# Patient Record
Sex: FEMALE | Race: BLACK OR AFRICAN AMERICAN | NOT HISPANIC OR LATINO
[De-identification: names, ages, dates, MRNs, and addresses within clinical notes are randomized per-mention and may not be internally consistent; named-entity substitution may affect disease eponyms.]

---

## 2017-02-28 ENCOUNTER — APPOINTMENT (OUTPATIENT)
Dept: CARDIOLOGY | Facility: CLINIC | Age: 62
End: 2017-02-28

## 2017-02-28 VITALS — BODY MASS INDEX: 34.5 KG/M2 | WEIGHT: 201 LBS

## 2017-02-28 VITALS
DIASTOLIC BLOOD PRESSURE: 79 MMHG | HEART RATE: 79 BPM | HEIGHT: 64 IN | SYSTOLIC BLOOD PRESSURE: 115 MMHG | OXYGEN SATURATION: 96 %

## 2017-03-06 ENCOUNTER — APPOINTMENT (OUTPATIENT)
Dept: CARDIOLOGY | Facility: CLINIC | Age: 62
End: 2017-03-06

## 2017-03-08 ENCOUNTER — NON-APPOINTMENT (OUTPATIENT)
Age: 62
End: 2017-03-08

## 2017-03-09 ENCOUNTER — APPOINTMENT (OUTPATIENT)
Dept: CARDIOLOGY | Facility: CLINIC | Age: 62
End: 2017-03-09

## 2017-03-09 VITALS
SYSTOLIC BLOOD PRESSURE: 114 MMHG | OXYGEN SATURATION: 95 % | WEIGHT: 200 LBS | HEIGHT: 64 IN | HEART RATE: 63 BPM | DIASTOLIC BLOOD PRESSURE: 78 MMHG | BODY MASS INDEX: 34.15 KG/M2

## 2017-03-09 DIAGNOSIS — R07.89 OTHER CHEST PAIN: ICD-10-CM

## 2017-05-10 ENCOUNTER — APPOINTMENT (OUTPATIENT)
Dept: CARDIOLOGY | Facility: CLINIC | Age: 62
End: 2017-05-10

## 2017-05-18 ENCOUNTER — FORM ENCOUNTER (OUTPATIENT)
Age: 62
End: 2017-05-18

## 2017-05-19 ENCOUNTER — OUTPATIENT (OUTPATIENT)
Dept: OUTPATIENT SERVICES | Facility: HOSPITAL | Age: 62
LOS: 1 days | End: 2017-05-19

## 2017-05-19 ENCOUNTER — APPOINTMENT (OUTPATIENT)
Dept: ULTRASOUND IMAGING | Facility: CLINIC | Age: 62
End: 2017-05-19

## 2017-05-19 ENCOUNTER — APPOINTMENT (OUTPATIENT)
Dept: FAMILY MEDICINE | Facility: CLINIC | Age: 62
End: 2017-05-19

## 2017-05-19 VITALS
HEART RATE: 79 BPM | DIASTOLIC BLOOD PRESSURE: 70 MMHG | WEIGHT: 212 LBS | SYSTOLIC BLOOD PRESSURE: 114 MMHG | BODY MASS INDEX: 36.19 KG/M2 | TEMPERATURE: 98.6 F | HEIGHT: 64 IN

## 2017-05-19 DIAGNOSIS — Z90.49 ACQUIRED ABSENCE OF OTHER SPECIFIED PARTS OF DIGESTIVE TRACT: Chronic | ICD-10-CM

## 2017-05-19 DIAGNOSIS — Z23 ENCOUNTER FOR IMMUNIZATION: ICD-10-CM

## 2017-05-19 DIAGNOSIS — O34.21 MATERNAL CARE FOR SCAR FROM PREVIOUS CESAREAN DELIVERY: Chronic | ICD-10-CM

## 2017-05-19 DIAGNOSIS — Z98.89 OTHER SPECIFIED POSTPROCEDURAL STATES: Chronic | ICD-10-CM

## 2017-05-23 ENCOUNTER — NON-APPOINTMENT (OUTPATIENT)
Age: 62
End: 2017-05-23

## 2017-05-23 ENCOUNTER — APPOINTMENT (OUTPATIENT)
Dept: CARDIOLOGY | Facility: CLINIC | Age: 62
End: 2017-05-23

## 2017-05-23 VITALS
WEIGHT: 212 LBS | DIASTOLIC BLOOD PRESSURE: 76 MMHG | SYSTOLIC BLOOD PRESSURE: 120 MMHG | BODY MASS INDEX: 36.19 KG/M2 | HEIGHT: 64 IN | HEART RATE: 75 BPM | OXYGEN SATURATION: 96 %

## 2017-05-23 DIAGNOSIS — R07.89 OTHER CHEST PAIN: ICD-10-CM

## 2017-05-23 RX ORDER — CYCLOBENZAPRINE HYDROCHLORIDE 10 MG/1
10 TABLET, FILM COATED ORAL
Qty: 30 | Refills: 0 | Status: DISCONTINUED | COMMUNITY
Start: 2016-11-22

## 2017-05-23 RX ORDER — SODIUM SULFATE, POTASSIUM SULFATE, MAGNESIUM SULFATE 17.5; 3.13; 1.6 G/ML; G/ML; G/ML
17.5-3.13-1.6 SOLUTION, CONCENTRATE ORAL
Qty: 354 | Refills: 0 | Status: DISCONTINUED | COMMUNITY
Start: 2017-03-20

## 2017-06-06 ENCOUNTER — OTHER (OUTPATIENT)
Age: 62
End: 2017-06-06

## 2017-06-10 ENCOUNTER — FORM ENCOUNTER (OUTPATIENT)
Age: 62
End: 2017-06-10

## 2017-06-11 ENCOUNTER — OUTPATIENT (OUTPATIENT)
Dept: OUTPATIENT SERVICES | Facility: HOSPITAL | Age: 62
LOS: 1 days | End: 2017-06-11
Payer: COMMERCIAL

## 2017-06-11 ENCOUNTER — APPOINTMENT (OUTPATIENT)
Dept: MRI IMAGING | Facility: CLINIC | Age: 62
End: 2017-06-11

## 2017-06-11 DIAGNOSIS — O34.21 MATERNAL CARE FOR SCAR FROM PREVIOUS CESAREAN DELIVERY: Chronic | ICD-10-CM

## 2017-06-11 DIAGNOSIS — Z90.49 ACQUIRED ABSENCE OF OTHER SPECIFIED PARTS OF DIGESTIVE TRACT: Chronic | ICD-10-CM

## 2017-06-11 DIAGNOSIS — R22.9 LOCALIZED SWELLING, MASS AND LUMP, UNSPECIFIED: ICD-10-CM

## 2017-06-11 DIAGNOSIS — Z98.89 OTHER SPECIFIED POSTPROCEDURAL STATES: Chronic | ICD-10-CM

## 2017-06-11 PROCEDURE — A9585: CPT

## 2017-06-11 PROCEDURE — 82565 ASSAY OF CREATININE: CPT

## 2017-06-11 PROCEDURE — 74183 MRI ABD W/O CNTR FLWD CNTR: CPT

## 2017-06-16 ENCOUNTER — OTHER (OUTPATIENT)
Age: 62
End: 2017-06-16

## 2017-06-19 ENCOUNTER — APPOINTMENT (OUTPATIENT)
Dept: MRI IMAGING | Facility: CLINIC | Age: 62
End: 2017-06-19

## 2017-06-26 ENCOUNTER — APPOINTMENT (OUTPATIENT)
Dept: SURGERY | Facility: CLINIC | Age: 62
End: 2017-06-26

## 2017-06-26 VITALS
HEIGHT: 63.5 IN | SYSTOLIC BLOOD PRESSURE: 103 MMHG | WEIGHT: 212.03 LBS | RESPIRATION RATE: 14 BRPM | TEMPERATURE: 98.6 F | DIASTOLIC BLOOD PRESSURE: 64 MMHG | OXYGEN SATURATION: 97 % | BODY MASS INDEX: 37.11 KG/M2 | HEART RATE: 83 BPM

## 2017-06-26 DIAGNOSIS — I25.2 OLD MYOCARDIAL INFARCTION: ICD-10-CM

## 2017-06-26 DIAGNOSIS — Z82.49 FAMILY HISTORY OF ISCHEMIC HEART DISEASE AND OTHER DISEASES OF THE CIRCULATORY SYSTEM: ICD-10-CM

## 2017-06-26 DIAGNOSIS — Z83.3 FAMILY HISTORY OF DIABETES MELLITUS: ICD-10-CM

## 2017-06-26 RX ORDER — IBUPROFEN 400 MG/1
400 TABLET, FILM COATED ORAL TWICE DAILY
Qty: 14 | Refills: 0 | Status: DISCONTINUED | COMMUNITY
Start: 2017-02-28 | End: 2017-06-26

## 2017-08-28 ENCOUNTER — APPOINTMENT (OUTPATIENT)
Dept: SURGERY | Facility: CLINIC | Age: 62
End: 2017-08-28
Payer: COMMERCIAL

## 2017-08-28 VITALS
HEIGHT: 63.5 IN | SYSTOLIC BLOOD PRESSURE: 110 MMHG | WEIGHT: 214 LBS | TEMPERATURE: 98.2 F | RESPIRATION RATE: 14 BRPM | BODY MASS INDEX: 37.45 KG/M2 | DIASTOLIC BLOOD PRESSURE: 72 MMHG | OXYGEN SATURATION: 97 % | HEART RATE: 76 BPM

## 2017-08-28 DIAGNOSIS — R19.00 INTRA-ABDOMINAL AND PELVIC SWELLING, MASS AND LUMP, UNSPECIFIED SITE: ICD-10-CM

## 2017-08-28 PROCEDURE — 99213 OFFICE O/P EST LOW 20 MIN: CPT

## 2018-02-26 ENCOUNTER — APPOINTMENT (OUTPATIENT)
Dept: SURGERY | Facility: CLINIC | Age: 63
End: 2018-02-26

## 2018-03-29 ENCOUNTER — APPOINTMENT (OUTPATIENT)
Dept: FAMILY MEDICINE | Facility: CLINIC | Age: 63
End: 2018-03-29
Payer: COMMERCIAL

## 2018-03-29 VITALS — DIASTOLIC BLOOD PRESSURE: 82 MMHG | SYSTOLIC BLOOD PRESSURE: 122 MMHG | HEART RATE: 84 BPM | WEIGHT: 212.13 LBS

## 2018-03-29 DIAGNOSIS — M25.561 PAIN IN RIGHT KNEE: ICD-10-CM

## 2018-03-29 PROCEDURE — 99214 OFFICE O/P EST MOD 30 MIN: CPT

## 2018-04-24 ENCOUNTER — APPOINTMENT (OUTPATIENT)
Dept: FAMILY MEDICINE | Facility: CLINIC | Age: 63
End: 2018-04-24

## 2018-05-29 ENCOUNTER — APPOINTMENT (OUTPATIENT)
Dept: CARDIOLOGY | Facility: CLINIC | Age: 63
End: 2018-05-29

## 2018-05-31 ENCOUNTER — RX RENEWAL (OUTPATIENT)
Age: 63
End: 2018-05-31

## 2018-06-05 ENCOUNTER — NON-APPOINTMENT (OUTPATIENT)
Age: 63
End: 2018-06-05

## 2018-06-05 ENCOUNTER — APPOINTMENT (OUTPATIENT)
Dept: CARDIOLOGY | Facility: CLINIC | Age: 63
End: 2018-06-05
Payer: COMMERCIAL

## 2018-06-05 VITALS
OXYGEN SATURATION: 95 % | DIASTOLIC BLOOD PRESSURE: 77 MMHG | HEART RATE: 100 BPM | HEIGHT: 63.5 IN | BODY MASS INDEX: 37.8 KG/M2 | SYSTOLIC BLOOD PRESSURE: 116 MMHG | WEIGHT: 216 LBS

## 2018-06-05 DIAGNOSIS — I21.4 NON-ST ELEVATION (NSTEMI) MYOCARDIAL INFARCTION: ICD-10-CM

## 2018-06-05 PROCEDURE — 99214 OFFICE O/P EST MOD 30 MIN: CPT

## 2018-06-05 PROCEDURE — 93000 ELECTROCARDIOGRAM COMPLETE: CPT

## 2018-06-21 ENCOUNTER — APPOINTMENT (OUTPATIENT)
Dept: CARDIOLOGY | Facility: CLINIC | Age: 63
End: 2018-06-21
Payer: COMMERCIAL

## 2018-06-21 PROCEDURE — 93306 TTE W/DOPPLER COMPLETE: CPT

## 2018-07-03 ENCOUNTER — APPOINTMENT (OUTPATIENT)
Dept: CARDIOLOGY | Facility: CLINIC | Age: 63
End: 2018-07-03

## 2018-07-05 ENCOUNTER — APPOINTMENT (OUTPATIENT)
Dept: FAMILY MEDICINE | Facility: CLINIC | Age: 63
End: 2018-07-05
Payer: COMMERCIAL

## 2018-07-05 VITALS
SYSTOLIC BLOOD PRESSURE: 123 MMHG | HEIGHT: 65 IN | WEIGHT: 215 LBS | BODY MASS INDEX: 35.82 KG/M2 | HEART RATE: 78 BPM | DIASTOLIC BLOOD PRESSURE: 65 MMHG

## 2018-07-05 PROCEDURE — 99396 PREV VISIT EST AGE 40-64: CPT

## 2018-07-05 NOTE — HEALTH RISK ASSESSMENT
[Good] : ~his/her~ current health as good [Excellent] : ~his/her~  mood as  excellent [No falls in past year] : Patient reported no falls in the past year [0] : 2) Feeling down, depressed, or hopeless: Not at all (0) [Patient reported mammogram was normal] : Patient reported mammogram was normal [Patient reported PAP Smear was normal] : Patient reported PAP Smear was normal [Patient reported bone density results were normal] : Patient reported bone density results were normal [Patient reported colonoscopy was normal] : Patient reported colonoscopy was normal [HIV Test offered] : HIV Test offered [Hepatitis C test offered] : Hepatitis C test offered [None] : None [With Family] : lives with family [# of Members in Household ___] :  household currently consist of [unfilled] member(s) [Employed] : employed [College] : College [] :  [# Of Children ___] : has [unfilled] children [Feels Safe at Home] : Feels safe at home [Fully functional (bathing, dressing, toileting, transferring, walking, feeding)] : Fully functional (bathing, dressing, toileting, transferring, walking, feeding) [Fully functional (using the telephone, shopping, preparing meals, housekeeping, doing laundry, using] : Fully functional and needs no help or supervision to perform IADLs (using the telephone, shopping, preparing meals, housekeeping, doing laundry, using transportation, managing medications and managing finances) [Smoke Detector] : smoke detector [Carbon Monoxide Detector] : carbon monoxide detector [Safety elements used in home] : safety elements used in home [Seat Belt] :  uses seat belt [Discussed at today's visit] : Advance Directives Discussed at today's visit [] : No [[id number]] : 0 [Change in mental status noted] : No change in mental status noted [Language] : denies difficulty with language [Learning/Retaining New Information] : denies difficulty learning/retaining new information [Handling Complex Tasks] : denies difficulty handling complex tasks [Reasoning] : denies difficulty with reasoning [Sexually Active] : not sexually active [High Risk Behavior] : no high risk behavior [Reports changes in hearing] : Reports no changes in hearing [Reports changes in vision] : Reports no changes in vision [Reports changes in dental health] : Reports no changes in dental health [Sunscreen] : does not use sunscreen [MammogramDate] : 01/18 [MammogramComments] : [facility] [PapSmearDate] : 12/17 [BoneDensityDate] : 1/17 [BoneDensityComments] : PER PATIENT REPORT DONE WITH GYN [ColonoscopyDate] : 04/17 [FreeTextEntry2] : NURSING [de-identified] : GLASSES FOR READING [de-identified] : DUE TO SEE DENTIST

## 2018-07-05 NOTE — COUNSELING
[Weight management counseling provided] : Weight management [Healthy eating counseling provided] : healthy eating [Activity counseling provided] : activity [Low Fat Diet] : Low fat diet [Decrease Portions] : Decrease food portions [Walking] : Walking [None] : None [Good understanding] : Patient has a good understanding of lifestyle changes and the steps needed to achieve self management goals

## 2018-07-05 NOTE — PHYSICAL EXAM
[No Acute Distress] : no acute distress [Well Nourished] : well nourished [Well-Appearing] : well-appearing [Normal Sclera/Conjunctiva] : normal sclera/conjunctiva [PERRL] : pupils equal round and reactive to light [EOMI] : extraocular movements intact [Normal Outer Ear/Nose] : the outer ears and nose were normal in appearance [Normal Oropharynx] : the oropharynx was normal [Normal TMs] : both tympanic membranes were normal [Supple] : supple [No Lymphadenopathy] : no lymphadenopathy [No Respiratory Distress] : no respiratory distress  [Clear to Auscultation] : lungs were clear to auscultation bilaterally [No Accessory Muscle Use] : no accessory muscle use [Normal Rate] : normal rate  [Regular Rhythm] : with a regular rhythm [Normal S1, S2] : normal S1 and S2 [No Murmur] : no murmur heard [Soft] : abdomen soft [Non Tender] : non-tender [Normal Bowel Sounds] : normal bowel sounds [No Joint Swelling] : no joint swelling [Grossly Normal Strength/Tone] : grossly normal strength/tone [No Rash] : no rash [Normal Gait] : normal gait [Coordination Grossly Intact] : coordination grossly intact [No Focal Deficits] : no focal deficits [Speech Grossly Normal] : speech grossly normal [Normal Mood] : the mood was normal [Normal Insight/Judgement] : insight and judgment were intact [Acne] : no acne

## 2018-07-05 NOTE — REVIEW OF SYSTEMS
[Fever] : no fever [Chills] : no chills [Fatigue] : no fatigue [Discharge] : no discharge [Pain] : no pain [Earache] : no earache [Nasal Discharge] : no nasal discharge [Sore Throat] : no sore throat [Chest Pain] : no chest pain [Palpitations] : no palpitations [Lower Ext Edema] : no lower extremity edema [Shortness Of Breath] : no shortness of breath [Cough] : no cough [Abdominal Pain] : no abdominal pain [Nausea] : no nausea [Diarrhea] : diarrhea [Vomiting] : no vomiting [Melena] : no melena [Dysuria] : no dysuria [Hematuria] : no hematuria [Joint Pain] : no joint pain [Muscle Pain] : no muscle pain [Itching] : no itching [Skin Rash] : no skin rash [Headache] : no headache [Dizziness] : no dizziness [Fainting] : no fainting [Suicidal] : not suicidal [Depression] : no depression [Easy Bleeding] : no easy bleeding [Easy Bruising] : no easy bruising

## 2018-07-05 NOTE — PLAN
[FreeTextEntry1] : ADVISED TO RESCHEDULE MISSED CARDIOLOGY APPOINTMENT AND OVERDUE TO FOLLOW-UP WITH SURGERY DR. VALENCIA.\par NEED MAMMOGRAM RESULTS FROM JAN[facility]\par TO CHECK ON LAST TETANUS VACCINE WITH [employer]\par FLU VACCINE IN THE FALL\par HEALTHY DIET AND LIFESTYLE MODIFICATIONS\par HEALTHY WEIGHT LOSS \par CHECK ROUTINE LAB WORK\par VISION SCREENING\par FOLLOW-UP ALL SPECIALISTS AS DIRECTED \par CALL WITH ANY QUESTIONS, CONCERNS OR CHANGES

## 2018-07-11 LAB — HEMOCCULT STL QL IA: NEGATIVE

## 2018-08-28 ENCOUNTER — RESULT REVIEW (OUTPATIENT)
Age: 63
End: 2018-08-28

## 2018-10-30 ENCOUNTER — APPOINTMENT (OUTPATIENT)
Dept: CARDIOLOGY | Facility: CLINIC | Age: 63
End: 2018-10-30

## 2018-11-13 ENCOUNTER — APPOINTMENT (OUTPATIENT)
Dept: CARDIOLOGY | Facility: CLINIC | Age: 63
End: 2018-11-13
Payer: COMMERCIAL

## 2018-11-13 ENCOUNTER — NON-APPOINTMENT (OUTPATIENT)
Age: 63
End: 2018-11-13

## 2018-11-13 VITALS
OXYGEN SATURATION: 95 % | WEIGHT: 213 LBS | DIASTOLIC BLOOD PRESSURE: 82 MMHG | HEIGHT: 65 IN | BODY MASS INDEX: 35.49 KG/M2 | SYSTOLIC BLOOD PRESSURE: 130 MMHG | HEART RATE: 76 BPM

## 2018-11-13 PROCEDURE — 99214 OFFICE O/P EST MOD 30 MIN: CPT | Mod: 25

## 2018-11-13 PROCEDURE — 93000 ELECTROCARDIOGRAM COMPLETE: CPT

## 2018-11-13 NOTE — PHYSICAL EXAM
[General Appearance - Well Developed] : well developed [No Deformities] : no deformities [General Appearance - In No Acute Distress] : no acute distress [Normal Conjunctiva] : the conjunctiva exhibited no abnormalities [Normal Oral Mucosa] : normal oral mucosa [Normal Jugular Venous V Waves Present] : normal jugular venous V waves present [] : no respiratory distress [Respiration, Rhythm And Depth] : normal respiratory rhythm and effort [Exaggerated Use Of Accessory Muscles For Inspiration] : no accessory muscle use [Auscultation Breath Sounds / Voice Sounds] : lungs were clear to auscultation bilaterally [Heart Rate And Rhythm] : heart rate and rhythm were normal [Heart Sounds] : normal S1 and S2 [Arterial Pulses Normal] : the arterial pulses were normal [Edema] : no peripheral edema present [Bowel Sounds] : normal bowel sounds [Abnormal Walk] : normal gait [Gait - Sufficient For Exercise Testing] : the gait was sufficient for exercise testing [Nail Clubbing] : no clubbing of the fingernails [Cyanosis, Localized] : no localized cyanosis [Nail Splinter Hemorrhages] : no splinter hemorrhages of the nails [Skin Color & Pigmentation] : normal skin color and pigmentation [Skin Turgor] : normal skin turgor [Oriented To Time, Place, And Person] : oriented to person, place, and time [Impaired Insight] : insight and judgment were intact [Mood] : the mood was normal [No Anxiety] : not feeling anxious

## 2018-11-15 NOTE — DISCUSSION/SUMMARY
[Patient] : the patient [Risks] : risks [Benefits] : benefits [FreeTextEntry1] : Plan\par 1. CAD- h/o NSTEMI in 2015, stable EKG, continue ASA \par 2. HTN- controlled, continue metoprolol BID\par 3. HLD- at goal\par 4. f/u in 1 year with Dr. Gómez\par \par Jillian Stark NP-C

## 2018-11-15 NOTE — HISTORY OF PRESENT ILLNESS
[FreeTextEntry1] : 63 year old female with a history of CAD , NSTEMI, HTN, HLD, who presents for routine follow up.  She states she is feeling well. She denies CP, SOB, GARCIA, palpitations, dizziness, lightheadedness, activity intolerance, syncope or near syncope. She reports she is compliant with her medications. She exercises on treadmill/ ash 2-3x/week, without CP or SOB.

## 2019-04-20 ENCOUNTER — TRANSCRIPTION ENCOUNTER (OUTPATIENT)
Age: 64
End: 2019-04-20

## 2019-10-11 ENCOUNTER — APPOINTMENT (OUTPATIENT)
Dept: FAMILY MEDICINE | Facility: CLINIC | Age: 64
End: 2019-10-11
Payer: COMMERCIAL

## 2019-10-11 ENCOUNTER — NON-APPOINTMENT (OUTPATIENT)
Age: 64
End: 2019-10-11

## 2019-10-11 VITALS
SYSTOLIC BLOOD PRESSURE: 140 MMHG | WEIGHT: 224 LBS | BODY MASS INDEX: 37.32 KG/M2 | HEART RATE: 78 BPM | HEIGHT: 65 IN | DIASTOLIC BLOOD PRESSURE: 78 MMHG

## 2019-10-11 VITALS — DIASTOLIC BLOOD PRESSURE: 82 MMHG | SYSTOLIC BLOOD PRESSURE: 122 MMHG

## 2019-10-11 PROCEDURE — 93000 ELECTROCARDIOGRAM COMPLETE: CPT

## 2019-10-11 PROCEDURE — 99396 PREV VISIT EST AGE 40-64: CPT | Mod: 25

## 2019-10-11 NOTE — HEALTH RISK ASSESSMENT
[Good] : ~his/her~ current health as good [Very Good] : ~his/her~  mood as very good [Patient reported mammogram was normal] : Patient reported mammogram was normal [Patient reported PAP Smear was normal] : Patient reported PAP Smear was normal [Patient reported bone density results were normal] : Patient reported bone density results were normal [Patient reported colonoscopy was normal] : Patient reported colonoscopy was normal [HIV Test offered] : HIV Test offered [Hepatitis C test offered] : Hepatitis C test offered [With Family] : lives with family [# of Members in Household ___] :  household currently consist of [unfilled] member(s) [Employed] : employed [College] : College [] :  [# Of Children ___] : has [unfilled] children [No falls in past year] : Patient reported no falls in the past year [0] : 2) Feeling down, depressed, or hopeless: Not at all (0) [None] : None [Feels Safe at Home] : Feels safe at home [Fully functional (bathing, dressing, toileting, transferring, walking, feeding)] : Fully functional (bathing, dressing, toileting, transferring, walking, feeding) [Fully functional (using the telephone, shopping, preparing meals, housekeeping, doing laundry, using] : Fully functional and needs no help or supervision to perform IADLs (using the telephone, shopping, preparing meals, housekeeping, doing laundry, using transportation, managing medications and managing finances) [Reports normal functional visual acuity (ie: able to read med bottle)] : Reports normal functional visual acuity [Smoke Detector] : smoke detector [Carbon Monoxide Detector] : carbon monoxide detector [Safety elements used in home] : safety elements used in home [Seat Belt] :  uses seat belt [With Patient/Caregiver] : With Patient/Caregiver [No] : In the past 12 months have you used drugs other than those required for medical reasons? No [] : No [de-identified] : WALKING. [de-identified] : GOOD. [[id number]] : 0 [[id number]] : 0 [Change in mental status noted] : No change in mental status noted [Language] : denies difficulty with language [Behavior] : denies difficulty with behavior [Learning/Retaining New Information] : denies difficulty learning/retaining new information [Handling Complex Tasks] : denies difficulty handling complex tasks [Reasoning] : denies difficulty with reasoning [Spatial Ability and Orientation] : denies difficulty with spatial ability and orientation [Reports changes in hearing] : Reports no changes in hearing [Reports changes in vision] : Reports no changes in vision [Reports changes in dental health] : Reports no changes in dental health [Sunscreen] : does not use sunscreen [MammogramDate] : 01/19 [PapSmearDate] : 12/18 [BoneDensityDate] : 01/19 [ColonoscopyDate] : 04/17 [de-identified] : GLASSES FOR READING.  [AdvancecareDate] : 10/19

## 2019-10-11 NOTE — COUNSELING
[Encouraged to increase physical activity] : Encouraged to increase physical activity [Decrease Portions] : decrease portions [____ min/wk Activity] : [unfilled] min/wk activity

## 2019-10-11 NOTE — HISTORY OF PRESENT ILLNESS
[FreeTextEntry1] : WELLNESS EXAM.  [de-identified] : MS. SO IS A PLEASANT 63 YO PRESENTING FOR YEARLY WELLNESS EXAM. FEELING WELL. CHRONIC HISTORY OF HYPERTENSION, HYPERLIPIDEMIA, CAD AND ASTHMA. BLOOD PRESSURE CONTROLLED ON METOPROLOL. UP TO DATE WITH GYN. HAD MAMMOGRAM AND BONE DENSITY. DIET IS GOOD. WALKING AND GOES TO THE GYM ONCE A WEEK FOR EXERCISE. HAS HAD NO CHEST PAIN, PALPITATIONS, SHORTNESS OF BREATH, HEADACHE, DIZZINESS, GI OR URINARY COMPLAINTS. NO OTHER COMPLAINTS TODAY. \par \par SPECIALISTS:\par GYN: DR. CORBETT - YEARLY EXAM\par PULMONOLOGY: DR. RHODES\par RHEUMATOLOGY: DR. DUNLAP\par

## 2019-10-11 NOTE — PHYSICAL EXAM
[No Acute Distress] : no acute distress [Well Nourished] : well nourished [Well-Appearing] : well-appearing [Normal Sclera/Conjunctiva] : normal sclera/conjunctiva [PERRL] : pupils equal round and reactive to light [EOMI] : extraocular movements intact [Normal Outer Ear/Nose] : the outer ears and nose were normal in appearance [Normal Oropharynx] : the oropharynx was normal [Normal TMs] : both tympanic membranes were normal [No Lymphadenopathy] : no lymphadenopathy [Supple] : supple [No Respiratory Distress] : no respiratory distress  [No Accessory Muscle Use] : no accessory muscle use [Clear to Auscultation] : lungs were clear to auscultation bilaterally [Normal Rate] : normal rate  [Regular Rhythm] : with a regular rhythm [Normal S1, S2] : normal S1 and S2 [No Murmur] : no murmur heard [Pedal Pulses Present] : the pedal pulses are present [No Edema] : there was no peripheral edema [Soft] : abdomen soft [Non Tender] : non-tender [Normal Bowel Sounds] : normal bowel sounds [No Joint Swelling] : no joint swelling [Grossly Normal Strength/Tone] : grossly normal strength/tone [No Rash] : no rash [Coordination Grossly Intact] : coordination grossly intact [No Focal Deficits] : no focal deficits [Normal Gait] : normal gait [Deep Tendon Reflexes (DTR)] : deep tendon reflexes were 2+ and symmetric [Speech Grossly Normal] : speech grossly normal [Normal Affect] : the affect was normal [Normal Mood] : the mood was normal [Normal Insight/Judgement] : insight and judgment were intact [de-identified] : DISTAL PULSES INTACT, FULL ROM.

## 2019-10-11 NOTE — REVIEW OF SYSTEMS
[Fever] : no fever [Chills] : no chills [Fatigue] : no fatigue [Discharge] : no discharge [Pain] : no pain [Vision Problems] : no vision problems [Earache] : no earache [Nasal Discharge] : no nasal discharge [Sore Throat] : no sore throat [Chest Pain] : no chest pain [Palpitations] : no palpitations [Lower Ext Edema] : no lower extremity edema [Shortness Of Breath] : no shortness of breath [Wheezing] : no wheezing [Cough] : no cough [Abdominal Pain] : no abdominal pain [Diarrhea] : diarrhea [Vomiting] : no vomiting [Melena] : no melena [Dysuria] : no dysuria [Hematuria] : no hematuria [Joint Pain] : no joint pain [Muscle Weakness] : no muscle weakness [Muscle Pain] : no muscle pain [Back Pain] : no back pain [Itching] : no itching [Skin Rash] : no skin rash [Headache] : no headache [Dizziness] : no dizziness [Fainting] : no fainting [Suicidal] : not suicidal [Depression] : no depression [Easy Bleeding] : no easy bleeding [Easy Bruising] : no easy bruising

## 2019-10-11 NOTE — PLAN
[FreeTextEntry1] : CHECK ROUTINE LAB WORK AND STOOL OCCULT \par EKG: NSR AT 78 BPM, NO ACUTE ST-T WAVE CHANGES\par VISION SCREENING \par HEALTHY DIET AND LIFESTYLE MODIFICATIONS\par HEALTHY WEIGHT LOSS REVIEWED \par BLOOD PRESSURE CONTROLLED \par CONTINUE ALL MEDICATIONS AS DIRECTED\par FOLLOW-UP ALL SPECIALISTS AS DIRECTED \par NEED MAMMOGRAM AND BONE DENSITY REPORTS - [facility]\par NEED RHEUMATOLOGY CONSULTANT NOTE - DR. DUNLAP\par CONSIDER SHINGRIX\par CALL WITH ANY QUESTIONS, CONCERNS OR CHANGES.

## 2019-10-24 ENCOUNTER — APPOINTMENT (OUTPATIENT)
Dept: FAMILY MEDICINE | Facility: CLINIC | Age: 64
End: 2019-10-24
Payer: COMMERCIAL

## 2019-10-24 VITALS
SYSTOLIC BLOOD PRESSURE: 140 MMHG | DIASTOLIC BLOOD PRESSURE: 90 MMHG | BODY MASS INDEX: 38.24 KG/M2 | HEART RATE: 82 BPM | HEIGHT: 64 IN | WEIGHT: 224 LBS

## 2019-10-24 DIAGNOSIS — E55.9 VITAMIN D DEFICIENCY, UNSPECIFIED: ICD-10-CM

## 2019-10-24 PROCEDURE — 99214 OFFICE O/P EST MOD 30 MIN: CPT

## 2019-10-27 PROBLEM — E55.9 VITAMIN D INSUFFICIENCY: Status: ACTIVE | Noted: 2019-10-27

## 2019-10-27 NOTE — HISTORY OF PRESENT ILLNESS
[FreeTextEntry1] : F/U HTN [de-identified] : MS. SO IS A PLEASANT 63 YO PRESENTING FOR FOLLOW-UP TO REVIEW LABS. CHRONIC HISTORY OF HYPERTENSION, HYPERLIPIDEMIA, CAD AND ASTHMA. BLOOD PRESSURE CONTROLLED ON METOPROLOL. LAST SAW GYN IN DECEMBER 2018. STATES THAT SHE HAS A KNOWN HISTORY OF HEPATITIS C IN 2001, AND WAS GIVEN MEDICATION TREATMENT FOR 2 YEARS BY GI DR. LOVE. DID NOT FOLLOW UP AFTER AND STATES SHE DID NOT NEED TO FOLLOW-UP.  HAS HAD NO ABDOMINAL PAIN OR N/V/D.  NO KNOWN HISTORY OF LOW VITAMIN D LEVELS.  IS NOT ON VITAMIN D SUPPLEMENTS.  NO KNOWN HISTORY OF HSV POSITIVE.  DENIES LESIONS.  SEES GYN ROUTINELY.  ASTHMA WELL CONTROLLED.  INFREQUENT RESCUE INHALER USE.  HAS HAD NO HEADACHES, DIZZINESS, CHEST PAIN, SHORTNESS OF BREATH, GI OR URINARY COMPLAINTS. NO OTHER COMPLAINTS TODAY.

## 2019-10-27 NOTE — REVIEW OF SYSTEMS
[Fever] : no fever [Chills] : no chills [Fatigue] : no fatigue [Chest Pain] : no chest pain [Palpitations] : no palpitations [Lower Ext Edema] : no lower extremity edema [Shortness Of Breath] : no shortness of breath [Wheezing] : no wheezing [Cough] : no cough [Abdominal Pain] : no abdominal pain [Nausea] : no nausea [Diarrhea] : diarrhea [Vomiting] : no vomiting [Dysuria] : no dysuria [Hematuria] : no hematuria [Joint Pain] : no joint pain [Muscle Weakness] : no muscle weakness [Muscle Pain] : no muscle pain [Headache] : no headache [Dizziness] : no dizziness [Fainting] : no fainting [Easy Bleeding] : no easy bleeding [Easy Bruising] : no easy bruising

## 2019-10-27 NOTE — PHYSICAL EXAM
[No Acute Distress] : no acute distress [Well Nourished] : well nourished [Well-Appearing] : well-appearing [No Lymphadenopathy] : no lymphadenopathy [Supple] : supple [No Respiratory Distress] : no respiratory distress  [No Accessory Muscle Use] : no accessory muscle use [Clear to Auscultation] : lungs were clear to auscultation bilaterally [Normal Rate] : normal rate  [Regular Rhythm] : with a regular rhythm [Normal S1, S2] : normal S1 and S2 [No Murmur] : no murmur heard [Pedal Pulses Present] : the pedal pulses are present [No Edema] : there was no peripheral edema [Soft] : abdomen soft [Non Tender] : non-tender [Normal Bowel Sounds] : normal bowel sounds [No Joint Swelling] : no joint swelling [Grossly Normal Strength/Tone] : grossly normal strength/tone [Speech Grossly Normal] : speech grossly normal [Memory Grossly Normal] : memory grossly normal [Alert and Oriented x3] : oriented to person, place, and time

## 2019-10-27 NOTE — PLAN
[FreeTextEntry1] : RECENT LAB WORK REVIEWED\par BEGIN VITAMIN D 2000 MG AND WILL MONITOR LEVELS\par KNOWN HISTORY OF PRIOR HEP C S/P TREATMENT - TO FOLLOW-UP WITH GI AND ADDITIONAL LAB WORK ORDERED\par DISCUSSED HSV RESULTS.  FOLLOW-UP WITH GYN AND PRECAUTIONS GIVEN\par FOLLOW-UP IN 6 MONTHS\par CONTINUE ALL MEDICATIONS AS DIRECTED\par FOLLOW-UP WITH ALL SPECIALISTS AS DIRECTED\par CALL WITH ANY QUESTIONS, CONCERNS OR CHANGES \par

## 2019-11-12 LAB — HEMOCCULT STL QL IA: NEGATIVE

## 2019-11-14 ENCOUNTER — APPOINTMENT (OUTPATIENT)
Dept: CARDIOLOGY | Facility: CLINIC | Age: 64
End: 2019-11-14

## 2019-12-12 ENCOUNTER — NON-APPOINTMENT (OUTPATIENT)
Age: 64
End: 2019-12-12

## 2019-12-12 ENCOUNTER — APPOINTMENT (OUTPATIENT)
Dept: CARDIOLOGY | Facility: CLINIC | Age: 64
End: 2019-12-12
Payer: COMMERCIAL

## 2019-12-12 VITALS
BODY MASS INDEX: 37.73 KG/M2 | WEIGHT: 221 LBS | OXYGEN SATURATION: 96 % | SYSTOLIC BLOOD PRESSURE: 109 MMHG | HEIGHT: 64 IN | HEART RATE: 69 BPM | DIASTOLIC BLOOD PRESSURE: 68 MMHG

## 2019-12-12 DIAGNOSIS — R06.02 SHORTNESS OF BREATH: ICD-10-CM

## 2019-12-12 PROCEDURE — 93000 ELECTROCARDIOGRAM COMPLETE: CPT

## 2019-12-12 PROCEDURE — 99214 OFFICE O/P EST MOD 30 MIN: CPT | Mod: 25

## 2020-01-02 ENCOUNTER — APPOINTMENT (OUTPATIENT)
Dept: CARDIOLOGY | Facility: CLINIC | Age: 65
End: 2020-01-02
Payer: COMMERCIAL

## 2020-01-02 PROCEDURE — 93015 CV STRESS TEST SUPVJ I&R: CPT

## 2020-01-02 PROCEDURE — A9500: CPT

## 2020-01-02 PROCEDURE — 78452 HT MUSCLE IMAGE SPECT MULT: CPT

## 2020-01-21 ENCOUNTER — NON-APPOINTMENT (OUTPATIENT)
Age: 65
End: 2020-01-21

## 2020-01-21 ENCOUNTER — APPOINTMENT (OUTPATIENT)
Dept: CARDIOLOGY | Facility: CLINIC | Age: 65
End: 2020-01-21
Payer: COMMERCIAL

## 2020-01-21 VITALS
BODY MASS INDEX: 36.7 KG/M2 | RESPIRATION RATE: 16 BRPM | HEART RATE: 106 BPM | WEIGHT: 215 LBS | DIASTOLIC BLOOD PRESSURE: 62 MMHG | SYSTOLIC BLOOD PRESSURE: 100 MMHG | HEIGHT: 64 IN | OXYGEN SATURATION: 95 %

## 2020-01-21 PROCEDURE — 99214 OFFICE O/P EST MOD 30 MIN: CPT

## 2020-01-21 RX ORDER — FENOPROFEN CALCIUM 200 MG/1
200 CAPSULE ORAL
Refills: 0 | Status: DISCONTINUED | COMMUNITY
Start: 2019-09-26 | End: 2020-01-21

## 2020-01-21 NOTE — PHYSICAL EXAM
[No Deformities] : no deformities [General Appearance - Well Developed] : well developed [Normal Conjunctiva] : the conjunctiva exhibited no abnormalities [General Appearance - In No Acute Distress] : no acute distress [Normal Oral Mucosa] : normal oral mucosa [Normal Jugular Venous V Waves Present] : normal jugular venous V waves present [] : no respiratory distress [Respiration, Rhythm And Depth] : normal respiratory rhythm and effort [Exaggerated Use Of Accessory Muscles For Inspiration] : no accessory muscle use [Auscultation Breath Sounds / Voice Sounds] : lungs were clear to auscultation bilaterally [Heart Rate And Rhythm] : heart rate and rhythm were normal [Heart Sounds] : normal S1 and S2 [Arterial Pulses Normal] : the arterial pulses were normal [Edema] : no peripheral edema present [Bowel Sounds] : normal bowel sounds [Abnormal Walk] : normal gait [Gait - Sufficient For Exercise Testing] : the gait was sufficient for exercise testing [Nail Clubbing] : no clubbing of the fingernails [Cyanosis, Localized] : no localized cyanosis [Nail Splinter Hemorrhages] : no splinter hemorrhages of the nails [Skin Color & Pigmentation] : normal skin color and pigmentation [Skin Turgor] : normal skin turgor [Oriented To Time, Place, And Person] : oriented to person, place, and time [Impaired Insight] : insight and judgment were intact [Mood] : the mood was normal [No Anxiety] : not feeling anxious

## 2020-01-21 NOTE — HISTORY OF PRESENT ILLNESS
[FreeTextEntry1] : 64 year old female with a history of CAD , NSTEMI, HTN, HLD, who presents for routine follow up. She denies CP, SOB at rest, palpitations, dizziness, lightheadedness, activity intolerance, syncope or near syncope. She reports she is compliant with her medications. She exercises on treadmill/ ash 2-3x/week, without CP. She does admit SOB with moderate activity like [place] stairs at the train station and with increased speed at the gym x few months. \par \par 1/2020- stress test- normal. Poor exercise tolerance. Normal PFTs per patient.

## 2020-01-21 NOTE — DISCUSSION/SUMMARY
[FreeTextEntry1] : 1. CAD- h/o NSTEMI in 2015, stable EKG, on ASA, c/o GARCIA- Normal stress test now. \par 2. HTN- controlled, continue metoprolol BID\par 3. HLD- at goal, recent LDL 81, h/o statin intolerance- continue fish oil \par 4. Shortness of breath- normal cardiac and pulmonary evaluation. \par Will have to consider cpet study to asses for underlying etiology of shortness of breath. Will call patient to set up once I obtain info from [place]. \par

## 2020-02-14 ENCOUNTER — APPOINTMENT (OUTPATIENT)
Dept: HEART FAILURE | Facility: CLINIC | Age: 65
End: 2020-02-14
Payer: COMMERCIAL

## 2020-02-14 PROCEDURE — 94200 LUNG FUNCTION TEST (MBC/MVV): CPT | Mod: 59

## 2020-02-14 PROCEDURE — 93018 CV STRESS TEST I&R ONLY: CPT | Mod: 59

## 2020-02-14 PROCEDURE — 94621 CARDIOPULM EXERCISE TESTING: CPT

## 2020-02-14 PROCEDURE — 94375 RESPIRATORY FLOW VOLUME LOOP: CPT

## 2020-02-14 PROCEDURE — 93000 ELECTROCARDIOGRAM COMPLETE: CPT | Mod: 59

## 2020-04-14 ENCOUNTER — APPOINTMENT (OUTPATIENT)
Dept: FAMILY MEDICINE | Facility: CLINIC | Age: 65
End: 2020-04-14
Payer: COMMERCIAL

## 2020-04-14 VITALS — BODY MASS INDEX: 34.66 KG/M2 | HEIGHT: 64 IN | HEART RATE: 80 BPM | WEIGHT: 203 LBS

## 2020-04-14 PROCEDURE — 99213 OFFICE O/P EST LOW 20 MIN: CPT | Mod: 95

## 2020-04-14 NOTE — REVIEW OF SYSTEMS
[Shortness Of Breath] : shortness of breath [Wheezing] : wheezing [Negative] : Neurological [Cough] : no cough [Itching] : no itching [Skin Rash] : no skin rash

## 2020-04-14 NOTE — HISTORY OF PRESENT ILLNESS
[Home] : at home, [unfilled] , at the time of the visit. [Medical Office: ([facility])___] : at the medical office located in  [Patient] : the patient [Self] : self [FreeTextEntry8] : START TIME: 4:30\par END TIME: 4:43\par \par MS. SO IS A PLEASANT 63 YO.  ADVISED FOR IN OFFICE APPOINTMENT FOR EVALUATION BUT REFUSED TODAY.  AS SUCH A TELEMEDICINE ENCOUNTER WAS PERFORMED.  SHE HAS A CHRONIC HISTORY OF ASTHMA.  HER ASTHMA HAS BEEN BOTHERING HER FOR THE PAST 5 - 6 DAYS.  HAS WHEEZING.  SOME MUCOUS AFTER USING INHALER.  THAT MAKES HER COUGH BUT IN GENERAL NO COUGH.  HAS HAD NO FEVER.  NO N/V/D.  NO HEADACHE OR CONGESTION.   NO SORE THROAT.  IS USING THE PROAIR ONCE EVERY SIX HOURS.  USE OF PROAIR HELPS.  STILL ON SINGULAIR AND DULERA.  HAS HAD NO KNOWN SICK CONTACTS.  HAS HAD NO SWELLING OR CHEST PAIN.  NO DIZZINESS OR SYNCOPE.  FEELING TIRED.  SEES PULMONOLOGY.  HAS ALBUTEROL FROM PULMONOLOGIST.  NO OTHER COMPLAINTS TODAY.

## 2020-04-14 NOTE — PHYSICAL EXAM
[No Acute Distress] : no acute distress [Well Nourished] : well nourished [No Respiratory Distress] : no respiratory distress  [Speech Grossly Normal] : speech grossly normal [Normal Mood] : the mood was normal [Normal Insight/Judgement] : insight and judgment were intact [de-identified] : BUT ADVISED PICTURE QUALITY NOT GREAT SO DIFFICULT TO FULLY EVALUATE

## 2020-04-14 NOTE — PLAN
[FreeTextEntry1] : REFUSES ER AND IN OFFICE EVALUATION.   \par HAS ALBUTEROL AT HOME FROM PULMONARY. \par WILL CALL IN MEDROL DOSE NANCY\par CONTINUE DULERA AND SINGULAIR\par AGAIN STRESSED IMPORTANCE OF A PHYSICAL EVALUATION, WOULD LIKE PULSE OX, ETC. BUT REFUSED AT THIS TIME.  AWARE I AM LIMITED IN MY EVALUATION OF HER TODAY AND HAVE CONCERNS THAT I WOULD LIKE FOR HER TO HAVE ADDRESSED WITH A PHYSICAL EVALUATION BUT SHE AGAIN REFUSES\par SHE AGREES TO ALSO CONTACT HER PULMONOLOGIST AND WILL RECONSIDER THE ER WITH ANY WORSENING\par SUSPICION OF COVID LOW AT THIS TIME AND MAY JUST BE AN ASTHMA EXACERBATION; NONETHELESS, I HAVE DISCUSSED THIS WITH MS. SO AND RECOMMENDED SHE NOT GO TO WORK AND PERFORM SELF-ISOLATION UNTIL WE SHE HOW SHE IS FEELING POST TREATMENT AS A PRECAUTION AS WE ARE CURRENTLY UNDERGOING A COVID PANDEMIC\par WILL CHECK BACK IN\par CALL WITH ANY QUESTIONS, CONCERNS OR CHANGES

## 2020-04-24 ENCOUNTER — APPOINTMENT (OUTPATIENT)
Dept: FAMILY MEDICINE | Facility: CLINIC | Age: 65
End: 2020-04-24
Payer: COMMERCIAL

## 2020-04-24 VITALS — WEIGHT: 203.56 LBS | HEIGHT: 64 IN | BODY MASS INDEX: 34.75 KG/M2

## 2020-04-24 PROCEDURE — 99213 OFFICE O/P EST LOW 20 MIN: CPT | Mod: 95

## 2020-04-24 RX ORDER — METHYLPREDNISOLONE 4 MG/1
4 TABLET ORAL
Qty: 1 | Refills: 0 | Status: DISCONTINUED | COMMUNITY
Start: 2020-04-14 | End: 2020-04-24

## 2020-04-24 NOTE — HISTORY OF PRESENT ILLNESS
[Home] : at home, [unfilled] , at the time of the visit. [Medical Office: ([facility])___] : at the medical office located in  [Patient] : the patient [Self] : self [FreeTextEntry1] : F/U ASTHMA [de-identified] : start time: 2:58\par end time: 3:08\par \par MS. SO IS A PLEASANT 63 YO FOR FOLLOW-UP.  TREATED FOR ASTHMA EXACERBATION AND ADVISED TO SELF-ISOLATE IN CASE COVID ALTHOUGH FELT TO BE RELATED TO ASTHMA.  WAS ADVISED TO CONTINUE DULERA AND SINGULAIR AND A MEDROL DOSE NANCY WAS GIVEN.  FEELING "MUCH BETTER".  NO LONGER TIRED.  SHORTNESS OF BREATH RESOLVED.  HAS HAD NO COUGH OR FEVER.  NO N/V/D.  NO CHEST PAIN.  HAS NO COMPLAINTS TODAY.  STARTED TO FEEL BETTER A WEEK AGO.  SELF-ISOLATED AS A PRECAUTION.  USING PROAIR AS NEEDED.  IS SUPPOSED TO GO BACK TO WORK ON MONDAY.

## 2020-04-24 NOTE — PHYSICAL EXAM
[No Acute Distress] : no acute distress [Well Nourished] : well nourished [Well-Appearing] : well-appearing [No Respiratory Distress] : no respiratory distress  [Speech Grossly Normal] : speech grossly normal [Memory Grossly Normal] : memory grossly normal [Normal Mood] : the mood was normal

## 2020-04-24 NOTE — PLAN
[FreeTextEntry1] : ASTHMA ACTION PLAN REVIEWED\par CONTINUE MEDICATIONS AS DIRECTED\par WAS ADVISED TO FOLLOW-UP CDC GUIDELINES FOR SELF-ISOLATION LAST VISIT AS PRECAUTION\par SEES PULMONOLOGY\par CALL WITH ANY QUESTIONS, CONCERNS OR CHANGES\par FOLLOW-UP FOR ROUTINE CARE

## 2020-07-28 ENCOUNTER — NON-APPOINTMENT (OUTPATIENT)
Age: 65
End: 2020-07-28

## 2020-07-28 ENCOUNTER — APPOINTMENT (OUTPATIENT)
Dept: CARDIOLOGY | Facility: CLINIC | Age: 65
End: 2020-07-28
Payer: COMMERCIAL

## 2020-07-28 VITALS
OXYGEN SATURATION: 96 % | TEMPERATURE: 98.5 F | DIASTOLIC BLOOD PRESSURE: 70 MMHG | WEIGHT: 214 LBS | HEART RATE: 86 BPM | HEIGHT: 64 IN | SYSTOLIC BLOOD PRESSURE: 109 MMHG | BODY MASS INDEX: 36.54 KG/M2

## 2020-07-28 PROCEDURE — 99214 OFFICE O/P EST MOD 30 MIN: CPT

## 2020-07-28 PROCEDURE — 93000 ELECTROCARDIOGRAM COMPLETE: CPT

## 2020-10-13 ENCOUNTER — LABORATORY RESULT (OUTPATIENT)
Age: 65
End: 2020-10-13

## 2020-10-13 ENCOUNTER — APPOINTMENT (OUTPATIENT)
Dept: FAMILY MEDICINE | Facility: CLINIC | Age: 65
End: 2020-10-13
Payer: COMMERCIAL

## 2020-10-13 VITALS
SYSTOLIC BLOOD PRESSURE: 110 MMHG | TEMPERATURE: 98.2 F | WEIGHT: 217 LBS | DIASTOLIC BLOOD PRESSURE: 86 MMHG | HEART RATE: 70 BPM | BODY MASS INDEX: 37.05 KG/M2 | HEIGHT: 64 IN

## 2020-10-13 DIAGNOSIS — Z86.19 PERSONAL HISTORY OF OTHER INFECTIOUS AND PARASITIC DISEASES: ICD-10-CM

## 2020-10-13 DIAGNOSIS — R74.8 ABNORMAL LEVELS OF OTHER SERUM ENZYMES: ICD-10-CM

## 2020-10-13 DIAGNOSIS — Z23 ENCOUNTER FOR IMMUNIZATION: ICD-10-CM

## 2020-10-13 DIAGNOSIS — R22.9 LOCALIZED SWELLING, MASS AND LUMP, UNSPECIFIED: ICD-10-CM

## 2020-10-13 DIAGNOSIS — R52 PAIN, UNSPECIFIED: ICD-10-CM

## 2020-10-13 DIAGNOSIS — Z87.898 PERSONAL HISTORY OF OTHER SPECIFIED CONDITIONS: ICD-10-CM

## 2020-10-13 DIAGNOSIS — R76.8 OTHER SPECIFIED ABNORMAL IMMUNOLOGICAL FINDINGS IN SERUM: ICD-10-CM

## 2020-10-13 DIAGNOSIS — J45.901 UNSPECIFIED ASTHMA WITH (ACUTE) EXACERBATION: ICD-10-CM

## 2020-10-13 PROCEDURE — 99396 PREV VISIT EST AGE 40-64: CPT | Mod: 25

## 2020-10-13 PROCEDURE — 90471 IMMUNIZATION ADMIN: CPT

## 2020-10-13 PROCEDURE — 36415 COLL VENOUS BLD VENIPUNCTURE: CPT

## 2020-10-13 PROCEDURE — 90686 IIV4 VACC NO PRSV 0.5 ML IM: CPT

## 2020-10-13 NOTE — PLAN
[FreeTextEntry1] : FLU VACCINE GIVEN AND TOLERATED WELL\par PREVNAR AGE 65\par CONSIDER SHINGRIX\par HEALTHY DIET AND LIFESTYLE MODIFICATIONS\par HEALTHY WEIGHT LOSS \par VISION SCREENING\par CHECK ROUTINE LAB WORK\par FOLLOW-UP ALL SPECIALISTS AS DIRECTED \par NEED MOST RECENT MAMMOGRAM RESULTS\par CALL WITH ANY QUESTIONS, CONCERNS OR CHANGES

## 2020-10-13 NOTE — HISTORY OF PRESENT ILLNESS
[FreeTextEntry1] : cpe [de-identified] : MS. SO IS A PLEASANT 64 YO PRESENTING FOR YEARLY CPE.  CHRONIC HISTORY OF ASTHMA, CAD, HYPERTENSION, HYPERLIPIDEMIA AND VITAMIN D INSUFFICIENCY.  RECENTLY SAW RHEUMATOLOGY DR. DUNLAP FOR JOINT PAIN.  ASTHMA HAS BEEN WELL CONTROLLED.  USES RESCUE INHALER ONCE A WEEK ON AVERAGE.  RECENT CARDIOLOGY FOLLOW-UP.  ON ASPIRIN.  METOPROLOL WAS STOPPED.  BLOOD PRESSURE CONTROLLED.  WOULD LIKE FLU VACCINE.  NO PRIOR ADVERSE REACTIONS TO VACCINATIONS.  HISTORY OF HEPATITIS C PREVIOUSLY TREATED BY GI.

## 2020-10-13 NOTE — HEALTH RISK ASSESSMENT
[Good] : ~his/her~  mood as  good [No] : In the past 12 months have you used drugs other than those required for medical reasons? No [No falls in past year] : Patient reported no falls in the past year [HIV Test offered] : HIV Test offered [Hepatitis C test offered] : Hepatitis C test offered [None] : None [With Family] : lives with family [Employed] : employed [College] : College [] :  [# Of Children ___] : has [unfilled] children [Feels Safe at Home] : Feels safe at home [Fully functional (bathing, dressing, toileting, transferring, walking, feeding)] : Fully functional (bathing, dressing, toileting, transferring, walking, feeding) [Fully functional (using the telephone, shopping, preparing meals, housekeeping, doing laundry, using] : Fully functional and needs no help or supervision to perform IADLs (using the telephone, shopping, preparing meals, housekeeping, doing laundry, using transportation, managing medications and managing finances) [Reports normal functional visual acuity (ie: able to read med bottle)] : Reports normal functional visual acuity [Smoke Detector] : smoke detector [Carbon Monoxide Detector] : carbon monoxide detector [Safety elements used in home] : safety elements used in home [Seat Belt] :  uses seat belt [With Patient/Caregiver] : With Patient/Caregiver [] : No [de-identified] : WALKING DAILY A HALF A MILE [de-identified] : "OKAY" [Change in mental status noted] : No change in mental status noted [Language] : denies difficulty with language [Learning/Retaining New Information] : denies difficulty learning/retaining new information [Handling Complex Tasks] : denies difficulty handling complex tasks [Reports changes in hearing] : Reports no changes in hearing [Reports changes in vision] : Reports no changes in vision [Reports changes in dental health] : Reports no changes in dental health [Sunscreen] : does not use sunscreen [MammogramDate] : 01/20 [PapSmearDate] : 12/19 [PapSmearComments] : HAS APPOINTMENT IN DECEMBER DR. CORBETT [BoneDensityDate] : 02/19 [ColonoscopyDate] : 04/17 [de-identified] : NURSING DEGREE [de-identified] : GLASSES FOR READING [de-identified] : DUE TO SEE DENTIST [AdvancecareDate] : 09/20

## 2020-10-14 LAB
25(OH)D3 SERPL-MCNC: 37.3 NG/ML
ALBUMIN SERPL ELPH-MCNC: 4.5 G/DL
ALP BLD-CCNC: 74 U/L
ALT SERPL-CCNC: 16 U/L
ANION GAP SERPL CALC-SCNC: 12 MMOL/L
APPEARANCE: CLEAR
AST SERPL-CCNC: 25 U/L
BILIRUB SERPL-MCNC: 0.4 MG/DL
BILIRUBIN URINE: NEGATIVE
BLOOD URINE: NEGATIVE
BUN SERPL-MCNC: 24 MG/DL
CALCIUM SERPL-MCNC: 9.7 MG/DL
CHLORIDE SERPL-SCNC: 103 MMOL/L
CHOLEST SERPL-MCNC: 168 MG/DL
CHOLEST/HDLC SERPL: 3.2 RATIO
CO2 SERPL-SCNC: 27 MMOL/L
COLOR: NORMAL
CREAT SERPL-MCNC: 0.71 MG/DL
GLUCOSE QUALITATIVE U: NEGATIVE
GLUCOSE SERPL-MCNC: 82 MG/DL
HDLC SERPL-MCNC: 53 MG/DL
KETONES URINE: NEGATIVE
LDLC SERPL CALC-MCNC: 95 MG/DL
LEUKOCYTE ESTERASE URINE: NEGATIVE
NITRITE URINE: NEGATIVE
PH URINE: 6.5
POTASSIUM SERPL-SCNC: 4.1 MMOL/L
PROT SERPL-MCNC: 7.5 G/DL
PROTEIN URINE: NEGATIVE
SODIUM SERPL-SCNC: 142 MMOL/L
SPECIFIC GRAVITY URINE: 1.01
T4 FREE SERPL-MCNC: 1.3 NG/DL
TRIGL SERPL-MCNC: 102 MG/DL
TSH SERPL-ACNC: 1.32 UIU/ML
UROBILINOGEN URINE: NORMAL

## 2020-10-20 LAB
BASOPHILS # BLD AUTO: 0.01 K/UL
BASOPHILS NFR BLD AUTO: 0.2 %
EOSINOPHIL # BLD AUTO: 0.29 K/UL
EOSINOPHIL NFR BLD AUTO: 4.8 %
HCT VFR BLD CALC: 43.4 %
HCV AB SER QL: REACTIVE
HCV S/CO RATIO: 6.71 S/CO
HGB BLD-MCNC: 13.2 G/DL
HIV1+2 AB SPEC QL IA.RAPID: NONREACTIVE
IMM GRANULOCYTES NFR BLD AUTO: 0.3 %
LYMPHOCYTES # BLD AUTO: 1.63 K/UL
LYMPHOCYTES NFR BLD AUTO: 27 %
MAN DIFF?: NORMAL
MCHC RBC-ENTMCNC: 30.4 GM/DL
MCHC RBC-ENTMCNC: 33.4 PG
MCV RBC AUTO: 109.9 FL
MONOCYTES # BLD AUTO: 0.36 K/UL
MONOCYTES NFR BLD AUTO: 6 %
NEUTROPHILS # BLD AUTO: 3.73 K/UL
NEUTROPHILS NFR BLD AUTO: 61.7 %
PLATELET # BLD AUTO: 236 K/UL
RBC # BLD: 3.95 M/UL
RBC # FLD: 12.6 %
WBC # FLD AUTO: 6.04 K/UL

## 2020-11-02 LAB — HEMOCCULT STL QL IA: POSITIVE

## 2021-01-27 ENCOUNTER — RX RENEWAL (OUTPATIENT)
Age: 66
End: 2021-01-27

## 2021-04-29 ENCOUNTER — RX RENEWAL (OUTPATIENT)
Age: 66
End: 2021-04-29

## 2021-09-02 ENCOUNTER — NON-APPOINTMENT (OUTPATIENT)
Age: 66
End: 2021-09-02

## 2021-09-02 ENCOUNTER — APPOINTMENT (OUTPATIENT)
Dept: FAMILY MEDICINE | Facility: CLINIC | Age: 66
End: 2021-09-02
Payer: COMMERCIAL

## 2021-09-02 VITALS
BODY MASS INDEX: 36.02 KG/M2 | WEIGHT: 211 LBS | DIASTOLIC BLOOD PRESSURE: 72 MMHG | SYSTOLIC BLOOD PRESSURE: 122 MMHG | HEIGHT: 64 IN | HEART RATE: 80 BPM

## 2021-09-02 DIAGNOSIS — M79.644 PAIN IN RIGHT FINGER(S): ICD-10-CM

## 2021-09-02 DIAGNOSIS — Z23 ENCOUNTER FOR IMMUNIZATION: ICD-10-CM

## 2021-09-02 PROCEDURE — 90670 PCV13 VACCINE IM: CPT

## 2021-09-02 PROCEDURE — 93000 ELECTROCARDIOGRAM COMPLETE: CPT | Mod: 59

## 2021-09-02 PROCEDURE — G0444 DEPRESSION SCREEN ANNUAL: CPT | Mod: NC,59

## 2021-09-02 PROCEDURE — G0009: CPT

## 2021-09-02 PROCEDURE — 99214 OFFICE O/P EST MOD 30 MIN: CPT | Mod: 25

## 2021-09-02 PROCEDURE — 99397 PER PM REEVAL EST PAT 65+ YR: CPT | Mod: 25

## 2021-09-02 RX ORDER — NAPROXEN 375 MG/1
375 TABLET ORAL
Qty: 30 | Refills: 0 | Status: DISCONTINUED | COMMUNITY
End: 2021-09-02

## 2021-09-03 ENCOUNTER — EMERGENCY (EMERGENCY)
Facility: HOSPITAL | Age: 66
LOS: 0 days | Discharge: HOME | End: 2021-09-03
Attending: STUDENT IN AN ORGANIZED HEALTH CARE EDUCATION/TRAINING PROGRAM
Payer: COMMERCIAL

## 2021-09-03 ENCOUNTER — NON-APPOINTMENT (OUTPATIENT)
Age: 66
End: 2021-09-03

## 2021-09-03 ENCOUNTER — APPOINTMENT (OUTPATIENT)
Dept: ORTHOPEDIC SURGERY | Facility: CLINIC | Age: 66
End: 2021-09-03
Payer: COMMERCIAL

## 2021-09-03 VITALS
SYSTOLIC BLOOD PRESSURE: 134 MMHG | RESPIRATION RATE: 16 BRPM | TEMPERATURE: 98 F | OXYGEN SATURATION: 98 % | HEART RATE: 80 BPM | DIASTOLIC BLOOD PRESSURE: 62 MMHG

## 2021-09-03 VITALS — WEIGHT: 210.98 LBS | HEIGHT: 66 IN

## 2021-09-03 DIAGNOSIS — Z90.49 ACQUIRED ABSENCE OF OTHER SPECIFIED PARTS OF DIGESTIVE TRACT: Chronic | ICD-10-CM

## 2021-09-03 DIAGNOSIS — Z90.49 ACQUIRED ABSENCE OF OTHER SPECIFIED PARTS OF DIGESTIVE TRACT: ICD-10-CM

## 2021-09-03 DIAGNOSIS — J45.909 UNSPECIFIED ASTHMA, UNCOMPLICATED: ICD-10-CM

## 2021-09-03 DIAGNOSIS — L03.011 CELLULITIS OF RIGHT FINGER: ICD-10-CM

## 2021-09-03 DIAGNOSIS — O34.21 MATERNAL CARE FOR SCAR FROM PREVIOUS CESAREAN DELIVERY: Chronic | ICD-10-CM

## 2021-09-03 DIAGNOSIS — I10 ESSENTIAL (PRIMARY) HYPERTENSION: ICD-10-CM

## 2021-09-03 DIAGNOSIS — Z98.89 OTHER SPECIFIED POSTPROCEDURAL STATES: Chronic | ICD-10-CM

## 2021-09-03 PROCEDURE — 99283 EMERGENCY DEPT VISIT LOW MDM: CPT | Mod: 25

## 2021-09-03 PROCEDURE — 73140 X-RAY EXAM OF FINGER(S): CPT | Mod: RT

## 2021-09-03 PROCEDURE — 87186 SC STD MICRODIL/AGAR DIL: CPT

## 2021-09-03 PROCEDURE — 73140 X-RAY EXAM OF FINGER(S): CPT | Mod: 26,RT

## 2021-09-03 PROCEDURE — 87070 CULTURE OTHR SPECIMN AEROBIC: CPT

## 2021-09-03 PROCEDURE — 99283 EMERGENCY DEPT VISIT LOW MDM: CPT

## 2021-09-03 PROCEDURE — 99204 OFFICE O/P NEW MOD 45 MIN: CPT

## 2021-09-03 RX ORDER — CEPHALEXIN 500 MG
500 CAPSULE ORAL ONCE
Refills: 0 | Status: COMPLETED | OUTPATIENT
Start: 2021-09-03 | End: 2021-09-03

## 2021-09-03 RX ORDER — AZTREONAM 2 G
1 VIAL (EA) INJECTION
Qty: 14 | Refills: 0
Start: 2021-09-03 | End: 2021-09-09

## 2021-09-03 RX ADMIN — Medication 500 MILLIGRAM(S): at 17:10

## 2021-09-03 NOTE — HISTORY OF PRESENT ILLNESS
[FreeTextEntry1] : 66-year-old female presents today for evaluation of a right middle finger infection. She reports pain and swelling localized to the tip of the finger and base of the nail bed present for about one week. She reports and does have been worsening since onset. She notes it has become extremely painful and hot to the touch. She reports associated difficulty with motion.She reports extreme sensitivity with any palpation and direct pressure. She was seen by her primary care doctor who recommended urgent evaluation today. she reports she has soaked the nail overnight in warm water with no drainage to date.

## 2021-09-03 NOTE — REASON FOR VISIT
[Initial Visit] : an initial visit for [FreeTextEntry2] : finger pain and swelling/infection present for one week.

## 2021-09-03 NOTE — ASSESSMENT
[FreeTextEntry1] : 66-year-old female is one week history of right middle finger infection felon.  \par At this time the patient has been recommended for emergency evaluation and treatment in the [facility] emergency room. Dr. Quick she has been made aware of the patient and her condition and she will likely undergo incision and drainage in the emergency room this evening. The patient has been sent they are straight from this office. The patient has been informed of the findings and urged to visit the emergency room tonight to address the active infection. Risks of delay in treatment were discussed and the patient did express understanding. the patient will likely follow up in our office next week for reevaluation status post incision and drainage.

## 2021-09-03 NOTE — ED STATDOCS - NSICDXPASTSURGICALHX_GEN_ALL_CORE_FT
PAST SURGICAL HISTORY:  Delivery with history of      H/O ventral hernia repair     S/P cholecystectomy

## 2021-09-03 NOTE — ED STATDOCS - ATTENDING CONTRIBUTION TO CARE
I, Josefina Chapin DO,  performed the initial face to face bedside interview with this patient regarding history of present illness, review of symptoms and relevant past medical, social and family history.  I completed an independent physical examination.  I was the initial provider who evaluated this patient. I have signed out the follow up of any pending tests (i.e. labs, radiological studies) to the ACP.  I have communicated the patient’s plan of care and disposition with the ACP.  The history, relevant review of systems, past medical and surgical history, medical decision making, and physical examination was documented by the scribe in my presence and I attest to the accuracy of the documentation.

## 2021-09-03 NOTE — PHYSICAL EXAM
[de-identified] : right middle finger:\par skin intact, moderate to severe swelling and erythema noted distal to the DIP joint/finger tip. \par diffuse tenderness along the distal middle fingertip. No active drainage but evidence of fluctuance/paronychia\par Full range of motion at the DIP joint nontender at the DIP joint no tenderness to palpation along the flexor tendon sheath. Refill less than 2 seconds, sensation thoroughly diminished [de-identified] : 3 x-ray views of the right middle finger taken today reviewed reveal no acute fracture, no evidence of osteomyelitis

## 2021-09-03 NOTE — ED STATDOCS - NSFOLLOWUPINSTRUCTIONS_ED_ALL_ED_FT
[employer]® CareNotes®     :  [facility]  	                       PARONYCHIA - AfterCare(R) Instructions(ER/ED)           Paronychia    WHAT YOU NEED TO KNOW:    Paronychia is an infection of your nail fold caused by bacteria or a fungus. The nail fold is the skin around your nail. Paronychia may happen suddenly and last for 6 weeks or longer. You may have paronychia on more than 1 finger or toe.    DISCHARGE INSTRUCTIONS:    Medicines:   •Td vaccine is a booster shot used to help prevent tetanus and diphtheria. The Td booster may be given to adolescents and adults every 10 years or for certain wounds and injuries.      •Antibiotics: This medicine will help fight or prevent an infection. It may be given as a pill, cream, or ointment.      •Steroids: This medicine will help decrease inflammation. It may be given as a pill, cream, or ointment.      •Antifungal medicine: This medicine helps kill fungus that may be causing your infection. It may be given as a cream or ointment.      •NSAIDs: These medicines decrease pain and swelling. NSAIDs are available without a doctor's order. Ask your healthcare provider which medicine is right for you. Ask how much to take and when to take it. Take as directed. NSAIDs can cause stomach bleeding and kidney problems if not taken correctly.      •Take your medicine as directed. Contact your healthcare provider if you think your medicine is not helping or if you have side effects. Tell him of her if you are allergic to any medicine. Keep a list of the medicines, vitamins, and herbs you take. Include the amounts, and when and why you take them. Bring the list or the pill bottles to follow-up visits. Carry your medicine list with you in case of an emergency.      Follow up with your doctor as directed: Write down your questions so you remember to ask them during your visits.     Self-care:   •Soak your nail: Soak your nail in a mixture of equal parts vinegar and water 3 or 4 times each day. This will help decrease inflammation.      •Apply a warm compress: Soak a washcloth in warm water and place it on your nail. This will help decrease inflammation.       •Elevate: Raise your nail above the level of your heart as often as you can. This will help decrease swelling and pain. Prop your nail on pillows or blankets to keep it elevated comfortably.       •Use lotion: Apply lotion after you wash your hands. This will prevent your skin from becoming too dry.       Prevent paronychia:   •Avoid chemicals and allergens that may harm your skin and nails. This includes soaps, laundry detergents, and nail products.      •Keep your nails clean and dry. Avoid soaking your nails in water. Use cotton-lined rubber gloves or wear 2 rubber gloves if you work with food or water. The gloves will help protect your nail folds.      •Keep your nails short. Do not bite your nails, pick at your hangnails, suck your fingers, or wear fake nails. Bring your own nail tools when you go to the nail salon.      Contact your healthcare provider if:   •Your nail becomes loose, deformed, or falls off.      •You have a large abscess on your nail.      •You have questions or concerns about your condition or care.      Return to the emergency department if:   •You have severe nail pain.      •The inflammation spreads to your hand or arm.         © Copyright [employer] 2021           back to top                          © Copyright [employer] 2021

## 2021-09-03 NOTE — ED STATDOCS - PROGRESS NOTE DETAILS
Pt with swelling to distal tip of R 3rd finger.  Performed I&D with copious amounts of pus drained.  Irrigated and cleaned area.  Applied Bacitracin and dressed.  Will dc home with PO Abx.  [place] PA-C

## 2021-09-03 NOTE — ED STATDOCS - PATIENT PORTAL LINK FT
You can access the FollowMyHealth Patient Portal offered by [facility] by registering at the following website: [email or web address]. By joining [employer]’s FollowMyHealth portal, you will also be able to view your health information using other applications (apps) compatible with our system.

## 2021-09-03 NOTE — ED STATDOCS - OBJECTIVE STATEMENT
67 y/o female with PMHx of asthma and HTN presents to the ED for evaluation of right 3rd finger pain and swelling. Pt denies any injuries or trauma. Did not cut finger-nails recently. Pharmacy: [address]

## 2021-09-04 ENCOUNTER — LABORATORY RESULT (OUTPATIENT)
Age: 66
End: 2021-09-04

## 2021-09-06 LAB
-  AMPICILLIN/SULBACTAM: SIGNIFICANT CHANGE UP
-  CEFAZOLIN: SIGNIFICANT CHANGE UP
-  CLINDAMYCIN: SIGNIFICANT CHANGE UP
-  DAPTOMYCIN: SIGNIFICANT CHANGE UP
-  ERYTHROMYCIN: SIGNIFICANT CHANGE UP
-  GENTAMICIN: SIGNIFICANT CHANGE UP
-  LINEZOLID: SIGNIFICANT CHANGE UP
-  OXACILLIN: SIGNIFICANT CHANGE UP
-  PENICILLIN: SIGNIFICANT CHANGE UP
-  RIFAMPIN: SIGNIFICANT CHANGE UP
-  TETRACYCLINE: SIGNIFICANT CHANGE UP
-  TRIMETHOPRIM/SULFAMETHOXAZOLE: SIGNIFICANT CHANGE UP
-  VANCOMYCIN: SIGNIFICANT CHANGE UP
METHOD TYPE: SIGNIFICANT CHANGE UP

## 2021-09-06 NOTE — ED POST DISCHARGE NOTE - RESULT SUMMARY
abscess culture results, on bactrim which is appropriate antibiotics, she states her abscess is much better. KALANI JVAIER

## 2021-09-09 LAB
CULTURE RESULTS: SIGNIFICANT CHANGE UP
ORGANISM # SPEC MICROSCOPIC CNT: SIGNIFICANT CHANGE UP
ORGANISM # SPEC MICROSCOPIC CNT: SIGNIFICANT CHANGE UP
SPECIMEN SOURCE: SIGNIFICANT CHANGE UP

## 2021-09-10 PROBLEM — M79.644 PAIN OF FINGER OF RIGHT HAND: Status: ACTIVE | Noted: 2021-09-02

## 2021-09-10 NOTE — REVIEW OF SYSTEMS
[Back Pain] : back pain [Negative] : Heme/Lymph [de-identified] : SEE HPI [FreeTextEntry9] : SEE PHI

## 2021-09-10 NOTE — REVIEW OF SYSTEMS
[Back Pain] : back pain [Negative] : Heme/Lymph [FreeTextEntry9] : SEE PHI [de-identified] : SEE HPI

## 2021-09-10 NOTE — HEALTH RISK ASSESSMENT
[Good] : ~his/her~ current health as good [Very Good] : ~his/her~  mood as very good [No falls in past year] : Patient reported no falls in the past year [No] : In the past 12 months have you used drugs other than those required for medical reasons? No [HIV Test offered] : HIV Test offered [Hepatitis C test offered] : Hepatitis C test offered [None] : None [Alone] : lives alone [Employed] : employed [College] : College [# Of Children ___] : has [unfilled] children [Feels Safe at Home] : Feels safe at home [Fully functional (bathing, dressing, toileting, transferring, walking, feeding)] : Fully functional (bathing, dressing, toileting, transferring, walking, feeding) [Fully functional (using the telephone, shopping, preparing meals, housekeeping, doing laundry, using] : Fully functional and needs no help or supervision to perform IADLs (using the telephone, shopping, preparing meals, housekeeping, doing laundry, using transportation, managing medications and managing finances) [Reports normal functional visual acuity (ie: able to read med bottle)] : Reports normal functional visual acuity [Smoke Detector] : smoke detector [Carbon Monoxide Detector] : carbon monoxide detector [Safety elements used in home] : safety elements used in home [With Patient/Caregiver] : , with patient/caregiver [0] : 2) Feeling down, depressed, or hopeless: Not at all (0) [PHQ-2 Negative - No further assessment needed] : PHQ-2 Negative - No further assessment needed [] : No [de-identified] : WALKING 4 - 5 TIMES A WEEK FOR 2 MILES EACH TIME [de-identified] : "GOOD".   [[id number]] : 0 [Change in mental status noted] : No change in mental status noted [Language] : denies difficulty with language [Learning/Retaining New Information] : denies difficulty learning/retaining new information [Handling Complex Tasks] : denies difficulty handling complex tasks [Sexually Active] : not sexually active [High Risk Behavior] : no high risk behavior [Reports changes in hearing] : Reports no changes in hearing [Reports changes in vision] : Reports no changes in vision [Reports changes in dental health] : Reports no changes in dental health [Sunscreen] : does not use sunscreen [MammogramDate] : 02/21 [MammogramComments] : rody [PapSmearDate] : 12/20 [BoneDensityDate] : 02/21 [BoneDensityComments] : rody [ColonoscopyDate] : 02/21 [ColonoscopyComments] : [facility]; DR. 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

## 2021-09-10 NOTE — PLAN
[FreeTextEntry1] : VISION SCREENING\par HEALTHY DIET AND LIFESTYLE MODIFICATIONS\par HEALTHY WEIGHT LOSS \par CHECK ROUTINE LAB WORK\par VACCINATIONS REVIEWED: FLU, COVID, TDAP, SHINGRIX\par FOLLOW-UP ALL SPECIALISTS AS DIRECTED \par \par CONCERN FOR INFECTION FINGER DISCUSSED; ARRANGED FOR STAT APPOINTMENT WITH HAND SURGEON TOMORROW.  TO ER SOONER WITH ANY WORSENING, FEVER, ETC.  WARM SOAKS.  KEEP CLEAN AND DRY\par DERMATOLOGY EVALUATION OF PROBABLE WARTS FINGERS\par MONITOR LIPIDS AND BLOOD PRESSURE\par CARDIOLOGY CONSULTANT NOTE APPRECIATED AS WELL AS PRIOR LABS AND IMAGING\par ASTHMA ACTION PLAN REVIEWED\par NEED MOST RECENT MAMMOGRAM AND COLONOSCOPY\par CALL WITH ANY QUESTIONS, CONCERNS OR CHANGES

## 2021-09-10 NOTE — PHYSICAL EXAM
[No Acute Distress] : no acute distress [Well Nourished] : well nourished [Well-Appearing] : well-appearing [Normal Sclera/Conjunctiva] : normal sclera/conjunctiva [PERRL] : pupils equal round and reactive to light [Normal Outer Ear/Nose] : the outer ears and nose were normal in appearance [Normal Oropharynx] : the oropharynx was normal [No Lymphadenopathy] : no lymphadenopathy [Supple] : supple [No Respiratory Distress] : no respiratory distress  [No Accessory Muscle Use] : no accessory muscle use [Clear to Auscultation] : lungs were clear to auscultation bilaterally [Normal Rate] : normal rate  [Regular Rhythm] : with a regular rhythm [Normal S1, S2] : normal S1 and S2 [Soft] : abdomen soft [Non Tender] : non-tender [Normal Bowel Sounds] : normal bowel sounds [Grossly Normal Strength/Tone] : grossly normal strength/tone [Coordination Grossly Intact] : coordination grossly intact [No Focal Deficits] : no focal deficits [Normal Gait] : normal gait [Deep Tendon Reflexes (DTR)] : deep tendon reflexes were 2+ and symmetric [Speech Grossly Normal] : speech grossly normal [Memory Grossly Normal] : memory grossly normal [Normal Mood] : the mood was normal [de-identified] : RIGHT MIDDLE FINGER WITH SWELLING, REDNESS, NO DRAINAGE [de-identified] : LEFT POINTER FINGER AND BASE OF RIGHT PINKY FINGER SMALL RAISED SKIN AREA.

## 2021-09-10 NOTE — PHYSICAL EXAM
[No Acute Distress] : no acute distress [Well Nourished] : well nourished [Well-Appearing] : well-appearing [Normal Sclera/Conjunctiva] : normal sclera/conjunctiva [PERRL] : pupils equal round and reactive to light [Normal Outer Ear/Nose] : the outer ears and nose were normal in appearance [Normal Oropharynx] : the oropharynx was normal [No Lymphadenopathy] : no lymphadenopathy [Supple] : supple [No Respiratory Distress] : no respiratory distress  [No Accessory Muscle Use] : no accessory muscle use [Clear to Auscultation] : lungs were clear to auscultation bilaterally [Normal Rate] : normal rate  [Regular Rhythm] : with a regular rhythm [Normal S1, S2] : normal S1 and S2 [Soft] : abdomen soft [Non Tender] : non-tender [Normal Bowel Sounds] : normal bowel sounds [Grossly Normal Strength/Tone] : grossly normal strength/tone [Coordination Grossly Intact] : coordination grossly intact [No Focal Deficits] : no focal deficits [Normal Gait] : normal gait [Deep Tendon Reflexes (DTR)] : deep tendon reflexes were 2+ and symmetric [Speech Grossly Normal] : speech grossly normal [Memory Grossly Normal] : memory grossly normal [Normal Mood] : the mood was normal [de-identified] : LEFT POINTER FINGER AND BASE OF RIGHT PINKY FINGER SMALL RAISED SKIN AREA.   [de-identified] : RIGHT MIDDLE FINGER WITH SWELLING, REDNESS, NO DRAINAGE

## 2021-09-10 NOTE — HEALTH RISK ASSESSMENT
[Good] : ~his/her~ current health as good [Very Good] : ~his/her~  mood as very good [No falls in past year] : Patient reported no falls in the past year [No] : In the past 12 months have you used drugs other than those required for medical reasons? No [HIV Test offered] : HIV Test offered [Hepatitis C test offered] : Hepatitis C test offered [Alone] : lives alone [None] : None [Employed] : employed [College] : College [# Of Children ___] : has [unfilled] children [Feels Safe at Home] : Feels safe at home [Fully functional (bathing, dressing, toileting, transferring, walking, feeding)] : Fully functional (bathing, dressing, toileting, transferring, walking, feeding) [Fully functional (using the telephone, shopping, preparing meals, housekeeping, doing laundry, using] : Fully functional and needs no help or supervision to perform IADLs (using the telephone, shopping, preparing meals, housekeeping, doing laundry, using transportation, managing medications and managing finances) [Reports normal functional visual acuity (ie: able to read med bottle)] : Reports normal functional visual acuity [Smoke Detector] : smoke detector [Carbon Monoxide Detector] : carbon monoxide detector [Safety elements used in home] : safety elements used in home [With Patient/Caregiver] : , with patient/caregiver [0] : 2) Feeling down, depressed, or hopeless: Not at all (0) [PHQ-2 Negative - No further assessment needed] : PHQ-2 Negative - No further assessment needed [] : No [de-identified] : WALKING 4 - 5 TIMES A WEEK FOR 2 MILES EACH TIME [[id number]] : 0 [de-identified] : "GOOD".   [Change in mental status noted] : No change in mental status noted [Language] : denies difficulty with language [Learning/Retaining New Information] : denies difficulty learning/retaining new information [Handling Complex Tasks] : denies difficulty handling complex tasks [Sexually Active] : not sexually active [High Risk Behavior] : no high risk behavior [Reports changes in hearing] : Reports no changes in hearing [Reports changes in vision] : Reports no changes in vision [Reports changes in dental health] : Reports no changes in dental health [Sunscreen] : does not use sunscreen [MammogramDate] : 02/21 [MammogramComments] : rody [PapSmearDate] : 12/20 [BoneDensityDate] : 02/21 [BoneDensityComments] : rody [ColonoscopyDate] : 02/21 [ColonoscopyComments] : [facility]; DR. 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

## 2021-09-10 NOTE — HISTORY OF PRESENT ILLNESS
[FreeTextEntry8] : black spot started on left pointer plamar then base then right pinky x 2 months\par \par 2 days right middle finger swollen.  painful.  no discharge.  no injury or trauma.  put on iodine.  \par \par seeing rheumatology dr. mckeon.  discussed back pain.  advised severe spinal stenosis lumbar region.  started on gabapentin 600 mg bid. [FreeTextEntry1] : PHYSICAL\par \par FINGER [de-identified] : MS. SO IS A PLEASANT 67 YO PRESENTING FOR YEARLY PHYSICAL.  HAS OVERALL BEEN DOING VERY WELL.  SEES CARDIOLOGY ROUTINELY IN FOLLOW-UP.  IS UP TO DATE WITH GYN, MAMMOGRAM, BONE DENSITY AND COLONOSCOPY.   DIET IS GOOD AND IS WALKING FOR EXERCISE. \par \par IS ACTIVELY SEEING RHEUMATOLOGY DR. DUNLAP.  DISCUSSED BACK PAIN.  ADVISED SEVERE SPINAL STENOSIS LUMBAR REGION.  STARTED ON GABAPENTIN 600 MG BID.  CHRONIC HISTORY OF ASTHMA WELL CONTROLLED ON SINGULAIR.  INFREQUENT INHALER USE.  TODAY ALSO WITH COMPLAINT THAT THERE WAS A BLACK SPOT ON HER LEFT POINTER FINGER AND THEN THE BASE OF HER RIGHT PINKY FOR TWO MONTHS.  RAISED AREA.  ALSO WITH COMPLAINT THAT FOR THE PAST TWO DAYS HER RIGHT MIDDLE FINGER HAS BEEN SWOLLEN.  IS PAINFUL.  NO DISCHARGE.  NO INJURY OR TRAUMA.  PUT ON IODINE.  HAS HAD NO FEVER.  NO OTHER AREAS OF CONCERN.  NO MEDICATIONS TAKEN.  CHRONIC HISTORY OF CAD, HYPERTENSION AND HYPERLIPIDEMIA.  BLOOD PRESSURE CONTROLLED.  ROUTINELY FOLLOWING UP WITH CARDIOLOGY.

## 2021-09-10 NOTE — HISTORY OF PRESENT ILLNESS
[FreeTextEntry8] : black spot started on left pointer plamar then base then right pinky x 2 months\par \par 2 days right middle finger swollen.  painful.  no discharge.  no injury or trauma.  put on iodine.  \par \par seeing rheumatology dr. mckeon.  discussed back pain.  advised severe spinal stenosis lumbar region.  started on gabapentin 600 mg bid. [FreeTextEntry1] : PHYSICAL\par \par FINGER [de-identified] : MS. SO IS A PLEASANT 67 YO PRESENTING FOR YEARLY PHYSICAL.  HAS OVERALL BEEN DOING VERY WELL.  SEES CARDIOLOGY ROUTINELY IN FOLLOW-UP.  IS UP TO DATE WITH GYN, MAMMOGRAM, BONE DENSITY AND COLONOSCOPY.   DIET IS GOOD AND IS WALKING FOR EXERCISE. \par \par IS ACTIVELY SEEING RHEUMATOLOGY DR. DUNLAP.  DISCUSSED BACK PAIN.  ADVISED SEVERE SPINAL STENOSIS LUMBAR REGION.  STARTED ON GABAPENTIN 600 MG BID.  CHRONIC HISTORY OF ASTHMA WELL CONTROLLED ON SINGULAIR.  INFREQUENT INHALER USE.  TODAY ALSO WITH COMPLAINT THAT THERE WAS A BLACK SPOT ON HER LEFT POINTER FINGER AND THEN THE BASE OF HER RIGHT PINKY FOR TWO MONTHS.  RAISED AREA.  ALSO WITH COMPLAINT THAT FOR THE PAST TWO DAYS HER RIGHT MIDDLE FINGER HAS BEEN SWOLLEN.  IS PAINFUL.  NO DISCHARGE.  NO INJURY OR TRAUMA.  PUT ON IODINE.  HAS HAD NO FEVER.  NO OTHER AREAS OF CONCERN.  NO MEDICATIONS TAKEN.  CHRONIC HISTORY OF CAD, HYPERTENSION AND HYPERLIPIDEMIA.  BLOOD PRESSURE CONTROLLED.  ROUTINELY FOLLOWING UP WITH CARDIOLOGY.

## 2021-09-24 LAB
25(OH)D3 SERPL-MCNC: 34.9 NG/ML
ALBUMIN SERPL ELPH-MCNC: 4.4 G/DL
ALP BLD-CCNC: 87 U/L
ALT SERPL-CCNC: 15 U/L
ANA SER IF-ACNC: NEGATIVE
ANION GAP SERPL CALC-SCNC: 13 MMOL/L
APPEARANCE: CLEAR
AST SERPL-CCNC: 26 U/L
BASOPHILS # BLD AUTO: 0.03 K/UL
BASOPHILS NFR BLD AUTO: 0.6 %
BILIRUB SERPL-MCNC: <0.2 MG/DL
BILIRUBIN URINE: NEGATIVE
BLOOD URINE: NEGATIVE
BUN SERPL-MCNC: 22 MG/DL
C TRACH RRNA SPEC QL NAA+PROBE: NOT DETECTED
CALCIUM SERPL-MCNC: 9.8 MG/DL
CHLORIDE SERPL-SCNC: 106 MMOL/L
CHOLEST SERPL-MCNC: 168 MG/DL
CO2 SERPL-SCNC: 23 MMOL/L
COLOR: NORMAL
CREAT SERPL-MCNC: 0.88 MG/DL
EOSINOPHIL # BLD AUTO: 0.39 K/UL
EOSINOPHIL NFR BLD AUTO: 7.6 %
ERYTHROCYTE [SEDIMENTATION RATE] IN BLOOD BY WESTERGREN METHOD: 38 MM/HR
ESTIMATED AVERAGE GLUCOSE: 108 MG/DL
GLUCOSE QUALITATIVE U: NEGATIVE
GLUCOSE SERPL-MCNC: 92 MG/DL
HAV IGM SER QL: NONREACTIVE
HBA1C MFR BLD HPLC: 5.4 %
HBV CORE IGM SER QL: NONREACTIVE
HBV SURFACE AG SER QL: NONREACTIVE
HCT VFR BLD CALC: 44.1 %
HCV AB SER QL: REACTIVE
HCV S/CO RATIO: 7.01 S/CO
HDLC SERPL-MCNC: 53 MG/DL
HGB BLD-MCNC: 14.3 G/DL
HIV1+2 AB SPEC QL IA.RAPID: NONREACTIVE
HSV 1+2 IGG SER IA-IMP: POSITIVE
HSV 1+2 IGG SER IA-IMP: POSITIVE
HSV1 IGG SER QL: 54.7 INDEX
HSV1 IGM SER QL: NEGATIVE
HSV2 AB FLD-ACNC: NEGATIVE
HSV2 IGG SER QL: 20.4 INDEX
IMM GRANULOCYTES NFR BLD AUTO: 0.2 %
KETONES URINE: NEGATIVE
LDLC SERPL CALC-MCNC: 97 MG/DL
LEUKOCYTE ESTERASE URINE: NEGATIVE
LYMPHOCYTES # BLD AUTO: 1.57 K/UL
LYMPHOCYTES NFR BLD AUTO: 30.7 %
MAN DIFF?: NORMAL
MCHC RBC-ENTMCNC: 32.4 GM/DL
MCHC RBC-ENTMCNC: 34.4 PG
MCV RBC AUTO: 106 FL
MONOCYTES # BLD AUTO: 0.35 K/UL
MONOCYTES NFR BLD AUTO: 6.8 %
N GONORRHOEA RRNA SPEC QL NAA+PROBE: NOT DETECTED
NEUTROPHILS # BLD AUTO: 2.76 K/UL
NEUTROPHILS NFR BLD AUTO: 54.1 %
NITRITE URINE: NEGATIVE
NONHDLC SERPL-MCNC: 115 MG/DL
PH URINE: 6
PLATELET # BLD AUTO: 233 K/UL
POTASSIUM SERPL-SCNC: 4.4 MMOL/L
PROT SERPL-MCNC: 7.7 G/DL
PROTEIN URINE: NEGATIVE
RBC # BLD: 4.16 M/UL
RBC # FLD: 12.7 %
RHEUMATOID FACT SER QL: <10 IU/ML
SODIUM SERPL-SCNC: 141 MMOL/L
SOURCE AMPLIFICATION: NORMAL
SPECIFIC GRAVITY URINE: 1.02
T PALLIDUM AB SER QL IA: NEGATIVE
T4 FREE SERPL-MCNC: 1.2 NG/DL
TRIGL SERPL-MCNC: 91 MG/DL
TSH SERPL-ACNC: 1.81 UIU/ML
URATE SERPL-MCNC: 4.3 MG/DL
UROBILINOGEN URINE: NORMAL
WBC # FLD AUTO: 5.11 K/UL

## 2021-10-15 ENCOUNTER — APPOINTMENT (OUTPATIENT)
Dept: FAMILY MEDICINE | Facility: CLINIC | Age: 66
End: 2021-10-15

## 2021-11-21 LAB — HEMOCCULT STL QL IA: NEGATIVE

## 2021-11-29 ENCOUNTER — APPOINTMENT (OUTPATIENT)
Dept: DERMATOLOGY | Facility: CLINIC | Age: 66
End: 2021-11-29
Payer: COMMERCIAL

## 2021-11-29 PROCEDURE — 17110 DESTRUCTION B9 LES UP TO 14: CPT

## 2021-12-20 ENCOUNTER — APPOINTMENT (OUTPATIENT)
Dept: OBGYN | Facility: CLINIC | Age: 66
End: 2021-12-20
Payer: COMMERCIAL

## 2021-12-20 VITALS
HEIGHT: 64 IN | WEIGHT: 218 LBS | SYSTOLIC BLOOD PRESSURE: 136 MMHG | HEART RATE: 89 BPM | DIASTOLIC BLOOD PRESSURE: 83 MMHG | BODY MASS INDEX: 37.22 KG/M2

## 2021-12-20 DIAGNOSIS — Z12.39 ENCOUNTER FOR OTHER SCREENING FOR MALIGNANT NEOPLASM OF BREAST: ICD-10-CM

## 2021-12-20 DIAGNOSIS — Z00.00 ENCOUNTER FOR GENERAL ADULT MEDICAL EXAMINATION W/OUT ABNORMAL FINDINGS: ICD-10-CM

## 2021-12-20 DIAGNOSIS — Z01.419 ENCOUNTER FOR GYNECOLOGICAL EXAMINATION (GENERAL) (ROUTINE) W/OUT ABNORMAL FINDINGS: ICD-10-CM

## 2021-12-20 DIAGNOSIS — Z78.0 ASYMPTOMATIC MENOPAUSAL STATE: ICD-10-CM

## 2021-12-20 PROCEDURE — 99387 INIT PM E/M NEW PAT 65+ YRS: CPT

## 2021-12-20 NOTE — PHYSICAL EXAM
[Chaperone Present] : A chaperone was present in the examining room during all aspects of the physical examination [Appropriately responsive] : appropriately responsive [Alert] : alert [No Acute Distress] : no acute distress [No Murmurs] : no murmurs [Soft] : soft [Non-tender] : non-tender [Non-distended] : non-distended [No HSM] : No HSM [No Lesions] : no lesions [No Mass] : no mass [Oriented x3] : oriented x3 [Examination Of The Breasts] : a normal appearance [Breast Palpation Diffuse Fibrous Tissue Bilateral] : fibrocystic changes [Normal] : normal [No Masses] : no breast masses were palpable

## 2021-12-20 NOTE — HISTORY OF PRESENT ILLNESS
[FreeTextEntry1] : 66-year-old female -0-2-3 presenting office for her annual well woman appointment, she was referred via her PCP.  Her prior gynecologic doctor retired last year.  Obstetrical history of 1 vaginal delivery followed via  section followed by a vaginal birth after  section.  She has a history of 2 miscarriages which required dilation and curettage.  Denies gynecologic history of uterine fibroids, ovarian cyst, STIs, abnormal Pap smears.  Her last mammogram was 1 year prior and normal, as per patient.  Medical history significant for asthma and spinal stenosis.  Additional surgical history of hernia repair, and a cholecystectomy.  Denies family history of gynecologic, breast, colon cancer.  Denies alcohol, tobacco, illicit drug use.  Denies allergies to medications.

## 2021-12-20 NOTE — PLAN
[FreeTextEntry1] : \par Clinical breast exam performed and self breast exam explained with understanding.  Patient had an Rx for a mammogram which she will have performed in January 2022.\par ASCCP guidelines for Pap smear, explained to patient and she does not require a Pap smear, as she has no history of abnormal Pap smears and she is 66 years of age.\par Rx for DEXA scan handed to patient with direction\par Patient falls with a PCP regularly and should maintain these appointments, as directed\par All questions addressed\par She may return to office in 1 years time, or as needed.

## 2021-12-28 ENCOUNTER — APPOINTMENT (OUTPATIENT)
Dept: DERMATOLOGY | Facility: CLINIC | Age: 66
End: 2021-12-28
Payer: COMMERCIAL

## 2021-12-28 PROCEDURE — 99212 OFFICE O/P EST SF 10 MIN: CPT | Mod: 25

## 2021-12-28 PROCEDURE — 17110 DESTRUCTION B9 LES UP TO 14: CPT

## 2022-01-04 ENCOUNTER — APPOINTMENT (OUTPATIENT)
Dept: CARDIOLOGY | Facility: CLINIC | Age: 67
End: 2022-01-04
Payer: COMMERCIAL

## 2022-01-04 VITALS
WEIGHT: 198 LBS | BODY MASS INDEX: 33.8 KG/M2 | TEMPERATURE: 96.4 F | DIASTOLIC BLOOD PRESSURE: 84 MMHG | SYSTOLIC BLOOD PRESSURE: 135 MMHG | HEIGHT: 64 IN

## 2022-01-04 PROCEDURE — 99213 OFFICE O/P EST LOW 20 MIN: CPT | Mod: 95

## 2022-01-04 NOTE — HISTORY OF PRESENT ILLNESS
[Home] : at home, [unfilled] , at the time of the visit. [Medical Office: ([facility])___] : at the medical office located in  [Verbal consent obtained from patient] : the patient, [unfilled] [FreeTextEntry1] : 64 year old female with a history of CAD , NSTEMI, HTN, HLD, who presents for routine follow up. She denies CP, SOB at rest, palpitations, dizziness, lightheadedness, activity intolerance, syncope or near syncope. She reports she is compliant with her medications. She exercises on treadmill/ ash 2-3x/week, without CP. She does admit SOB with moderate activity like climbing multiple stairs at the train station and with increased speed at the gym x few months. \par \par 1/2020- stress test- normal. Poor exercise tolerance. Normal PFTs per patient. \par \par 7/2020- Cardiopulmonary stress test- Reveals deconditioning. \par \par 1/2022. Tele health. \par Doing well. No chest pain. \par CPET in 2020- Shows deconditioned.  [FreeTextEntry4] : [employer]PHIRE [employer]son

## 2022-01-04 NOTE — DISCUSSION/SUMMARY
[FreeTextEntry1] : 1. Hypertension: Occasional elevated blood pressures. \par 2. CPET last year with deconditioning. \par 3. HLD: last check with PCP. \par 4. Follow up in 6 months in office. \par 5. Pre-op: cataract surgery- low risk. can proceed without any further cardiac testing.

## 2022-01-04 NOTE — REASON FOR VISIT
[Coronary Artery Disease] : coronary artery disease [FreeTextEntry1] : Time initiated 11:00AM\par \par

## 2022-01-18 ENCOUNTER — APPOINTMENT (OUTPATIENT)
Dept: FAMILY MEDICINE | Facility: CLINIC | Age: 67
End: 2022-01-18
Payer: COMMERCIAL

## 2022-01-18 VITALS
DIASTOLIC BLOOD PRESSURE: 80 MMHG | HEART RATE: 102 BPM | SYSTOLIC BLOOD PRESSURE: 136 MMHG | WEIGHT: 195 LBS | HEIGHT: 64 IN | BODY MASS INDEX: 33.29 KG/M2

## 2022-01-18 DIAGNOSIS — R71.8 OTHER ABNORMALITY OF RED BLOOD CELLS: ICD-10-CM

## 2022-01-18 DIAGNOSIS — H26.9 UNSPECIFIED CATARACT: ICD-10-CM

## 2022-01-18 PROCEDURE — 99215 OFFICE O/P EST HI 40 MIN: CPT

## 2022-01-18 RX ORDER — CEPHALEXIN 500 MG/1
500 TABLET ORAL 3 TIMES DAILY
Qty: 21 | Refills: 0 | Status: DISCONTINUED | COMMUNITY
Start: 2021-09-02 | End: 2022-01-18

## 2022-01-18 NOTE — HISTORY OF PRESENT ILLNESS
[Coronary Artery Disease] : coronary artery disease [Asthma] : asthma [No Adverse Anesthesia Reaction] : no adverse anesthesia reaction in self or family member [(Patient denies any chest pain, claudication, dyspnea on exertion, orthopnea, palpitations or syncope)] : Patient denies any chest pain, claudication, dyspnea on exertion, orthopnea, palpitations or syncope [Aortic Stenosis] : no aortic stenosis [Atrial Fibrillation] : no atrial fibrillation [Recent Myocardial Infarction] : no recent myocardial infarction [Implantable Device/Pacemaker] : no implantable device/pacemaker [COPD] : no COPD [Sleep Apnea] : no sleep apnea [Smoker] : not a smoker [Chronic Anticoagulation] : no chronic anticoagulation [Chronic Kidney Disease] : no chronic kidney disease [Diabetes] : no diabetes [FreeTextEntry1] : LEFT CATARACT SURGERY [FreeTextEntry2] : JANUARY 31, 2022 [FreeTextEntry3] : DR. OAT [FreeTextEntry4] : MS. SO IS A PLEASANT 65 YO PRESENTING FOR MEDICAL CLEARANCE FOR UPCOMING CATARACT SURGERY.  FEELING WELL TODAY.  NO HISTORY OF STROKE OR SEIZURE.  NO PERSONAL OR FAMILY HISTORY OF BLOOD OR CLOTTING DISORDERS.  DENIES EASY BLEEDING OR BRUISING.  IS ABLE TO WALK MULTIPLE BLOCKS AND GO UP MULTIPLE FLIGHTS OF STAIRS WITHOUT DIFFICULTY.   HAS HAD NO HEADACHE, DIZZINESS, CHEST PAIN, SHORTNESS OF BREATH, GI OR URINARY COMPLAINTS.  NO OTHER COMPLAINTS TODAY. CHRONIC HISTORY OF ASTHMA CONTROLLED ON DULERA.  DOES NOT USE RESCUE INHALER OFTEN.  ALSO HISTORY OF CAD, NSTEMI, HYPERTENSION AND HYPERLIPIDEMIA.  RECENT CARDIOLOGY EVALUATION.  BLOOD PRESSURE MEDICATION WAS STOPPED.   [FreeTextEntry7] : SAW CARDIOLOGY FOR CLEARANCE JANUARY 4, 2022

## 2022-01-18 NOTE — PLAN
[FreeTextEntry1] : OPTIMIZED MEDICALLY FOR PROPOSED LOW RISK SURGICAL PROCEDURE\par SAW CARDIOLOGY FOR CLEARANCE - NOTE APPRECIATED\par EKG REVIEWED: NSR AT 91 BPM, NO ACUTE ST-T WAVE CHANGES\par GI/DVT PROPHYLAXIS PER SURGERY\par PRIOR LAB WORK REVIEWED; WILL CHECK B12 LEVEL\par REVIEWED PRIOR MAMMOGRAM - UP TO DATE\par CALL WITH ANY QUESTIONS, CONCERNS OR CHANGES PRIOR TO PROCEDURE\par SUPPORT PROVIDED\par FOLLOW-UP POST-OPERATIVELY

## 2022-01-19 LAB — VIT B12 SERPL-MCNC: 1092 PG/ML

## 2022-01-25 ENCOUNTER — APPOINTMENT (OUTPATIENT)
Dept: DERMATOLOGY | Facility: CLINIC | Age: 67
End: 2022-01-25
Payer: COMMERCIAL

## 2022-01-25 PROCEDURE — 17110 DESTRUCTION B9 LES UP TO 14: CPT

## 2022-03-02 ENCOUNTER — APPOINTMENT (OUTPATIENT)
Dept: FAMILY MEDICINE | Facility: CLINIC | Age: 67
End: 2022-03-02

## 2022-03-08 ENCOUNTER — APPOINTMENT (OUTPATIENT)
Dept: DERMATOLOGY | Facility: CLINIC | Age: 67
End: 2022-03-08
Payer: COMMERCIAL

## 2022-03-08 PROCEDURE — 17110 DESTRUCTION B9 LES UP TO 14: CPT

## 2022-03-15 ENCOUNTER — APPOINTMENT (OUTPATIENT)
Dept: FAMILY MEDICINE | Facility: CLINIC | Age: 67
End: 2022-03-15

## 2022-05-18 ENCOUNTER — APPOINTMENT (OUTPATIENT)
Dept: FAMILY MEDICINE | Facility: CLINIC | Age: 67
End: 2022-05-18
Payer: COMMERCIAL

## 2022-05-18 VITALS
OXYGEN SATURATION: 96 % | HEIGHT: 64 IN | BODY MASS INDEX: 36.02 KG/M2 | SYSTOLIC BLOOD PRESSURE: 124 MMHG | DIASTOLIC BLOOD PRESSURE: 82 MMHG | WEIGHT: 211 LBS | HEART RATE: 85 BPM

## 2022-05-18 DIAGNOSIS — M79.89 OTHER SPECIFIED SOFT TISSUE DISORDERS: ICD-10-CM

## 2022-05-18 DIAGNOSIS — M48.061 SPINAL STENOSIS, LUMBAR REGION WITHOUT NEUROGENIC CLAUDICATION: ICD-10-CM

## 2022-05-18 PROCEDURE — 99213 OFFICE O/P EST LOW 20 MIN: CPT

## 2022-05-22 PROBLEM — M48.061 SPINAL STENOSIS OF LUMBAR REGION: Status: ACTIVE | Noted: 2021-09-02

## 2022-05-22 PROBLEM — M79.89 FINGER SWELLING: Status: RESOLVED | Noted: 2021-09-02 | Resolved: 2022-05-22

## 2022-05-22 NOTE — PLAN
[FreeTextEntry1] : CONTACT INFORMATION FOR BOTH ORTHO SPINE AND NEUROSURGERY GIVEN\par TO SEE PODIATRIST\par WELL FITTED SHOES\par FOLLOW-UP ALL SPECIALISTS AS DIRECTED \par CALL WITH ANY QUESTIONS, CONCERNS OR CHANGES

## 2022-05-22 NOTE — PHYSICAL EXAM
[No Acute Distress] : no acute distress [Well Nourished] : well nourished [Well-Appearing] : well-appearing [Normal Sclera/Conjunctiva] : normal sclera/conjunctiva [PERRL] : pupils equal round and reactive to light [No Respiratory Distress] : no respiratory distress  [No Accessory Muscle Use] : no accessory muscle use [Clear to Auscultation] : lungs were clear to auscultation bilaterally [Normal Rate] : normal rate  [Regular Rhythm] : with a regular rhythm [Normal S1, S2] : normal S1 and S2 [Grossly Normal Strength/Tone] : grossly normal strength/tone [Coordination Grossly Intact] : coordination grossly intact [No Focal Deficits] : no focal deficits [Normal Gait] : normal gait [Deep Tendon Reflexes (DTR)] : deep tendon reflexes were 2+ and symmetric [de-identified] : RIGHT SECOND TOE OVERLAPING FIRST, BUNION

## 2022-05-22 NOTE — HISTORY OF PRESENT ILLNESS
[FreeTextEntry1] : REFERRAL [de-identified] : MS. SO IS A PLEASANT 65 YO PRESENTING FOR FOLLOW-UP  SHE HAS A CHRONIC HISTORY OF ASTHMA CONTROLLED ON DULERA.  ALSO HISTORY OF CAD, NSTEMI, HYPERTENSION AND HYPERLIPIDEMIA. SEEING RHEUMATOLOGY DR. DUNLAP.  ADVISED LUMBAR SPINAL STENOSIS.  LAST SEEN IN FEBRUARY.  WAS ADVISED TO SEE A SPINE SPECIALIST.  IS ON GABAPENTIN.  REQUESTING NAME OF SPINE DOCTOR.  NO INCONTINENCE.  TROUBLE WALKING WHEN FIRST GETS UP AND THEN NO DIFFICULTY WITH WALKING.  ALSO WITH COMPLAINT THAT HER SECOND TOE IS OVERLAPPING HER FIRST TOE ON THE RIGHT.  HAS SOME PAIN.  BUNION.\par \par HAD COVID BOOSTER

## 2022-06-02 ENCOUNTER — APPOINTMENT (OUTPATIENT)
Dept: ORTHOPEDIC SURGERY | Facility: CLINIC | Age: 67
End: 2022-06-02
Payer: COMMERCIAL

## 2022-06-02 NOTE — PHYSICAL EXAM
[Poor Appearance] : well-appearing [Acute Distress] : not in acute distress [de-identified] : CONSTITUTIONAL: The patient is a very pleasant individual who is well-nourished and who appears stated age.\par PSYCHIATRIC: The patient is alert and oriented X 3 and in no apparent distress, and participates with orthopedic evaluation well.\par HEAD: Atraumatic and is nonsyndromic in appearance.\par EENT: No visible thyromegaly, EOMI.\par RESPIRATORY: Respiratory rate is regular, not dyspneic on examination.\par LYMPHATICS: There is no inguinal lymphadenopathy\par INTEGUMENTARY: Skin is clean, dry, and intact about the bilateral lower extremities and lumbar spine.\par VASCULAR: There is brisk capillary refill about the bilateral lower extremities.\par NEUROLOGIC: There are no pathologic reflexes. There is no decrease in sensation of the bilateral lower extremities on Wartenberg pinwheel examination. Deep tendon reflexes are well maintained at 2+/4 of the bilateral lower extremities and are symmetric.\par MUSCULOSKELETAL: There is no visible muscular atrophy. Manual motor strength is well maintained in the bilateral lower extremities. Range of motion of lumbar spine is well maintained. Negative tension sign and straight leg raise bilaterally. Quad extension, ankle dorsiflexion, EHL, plantar flexion, and ankle eversion are well preserved. Normal secondary orthopaedic exam of bilateral hips, greater trochanteric area, knees and ankles. No pain with internal or external rotation of the bilateral hips.  [de-identified] : AP and Lateral views of the lumbar spine taken 06/02/2022 demonstrates

## 2022-06-02 NOTE — DISCUSSION/SUMMARY
[de-identified] : \par \par Prior to appointment and during encounter with patient extensive medical records were reviewed including but not limited to, hospital records, out patient records, imaging results, and lab data. During this appointment the patient was examined, diagnoses were discussed and explained in a face to face manner. In addition extensive time was spent reviewing aforementioned diagnostic studies. Counseling including abnormal image results, differential diagnoses, treatment options, risk and benefits, lifestyle changes, current condition, and current medications was performed. Patient's comments, questions, and concerns were address and patient verbalized understanding. Based on this patient's presentation at our office, which is an orthopedic spine surgeon's office, this patient inherently / intrinsically has a risk, however minute, of developing  issues such as Cauda equina syndrome, bowel and bladder changes, or progression of motor or neurological deficits such as paralysis which may be permanent.

## 2022-06-02 NOTE — HISTORY OF PRESENT ILLNESS
[de-identified] : 66 year old F presents for an initial evaluation of stenosis.  [Ataxia] : no ataxia [Incontinence] : no incontinence [Loss of Dexterity] : good dexterity [Urinary Ret.] : no urinary retention

## 2022-06-10 ENCOUNTER — APPOINTMENT (OUTPATIENT)
Dept: ORTHOPEDIC SURGERY | Facility: CLINIC | Age: 67
End: 2022-06-10
Payer: COMMERCIAL

## 2022-06-10 VITALS
HEIGHT: 64 IN | HEART RATE: 94 BPM | DIASTOLIC BLOOD PRESSURE: 92 MMHG | SYSTOLIC BLOOD PRESSURE: 164 MMHG | BODY MASS INDEX: 36.02 KG/M2 | WEIGHT: 211 LBS

## 2022-06-10 DIAGNOSIS — M43.16 SPONDYLOLISTHESIS, LUMBAR REGION: ICD-10-CM

## 2022-06-10 DIAGNOSIS — M70.61 TROCHANTERIC BURSITIS, RIGHT HIP: ICD-10-CM

## 2022-06-10 PROCEDURE — 99204 OFFICE O/P NEW MOD 45 MIN: CPT

## 2022-06-10 PROCEDURE — 72100 X-RAY EXAM L-S SPINE 2/3 VWS: CPT

## 2022-06-10 NOTE — DISCUSSION/SUMMARY
[de-identified] : Patient seen and evaluated with regard to her spondylolisthesis and spinal stenosis patient repetitively answered no to neurogenic claudication questioning there is no change in bowel or bladder habits and my physical exam highlights greater trochanteric bursitis based upon lacking neurogenic claudication focal bursitis on the right and the patient states she has no interest in surgical management I would not schedule this patient or recommend an L4-5 lumbar laminectomy and instrumented fusion if she starts to present with worsening signs and symptoms consistent with neurogenic claudication this can be entertained patient will follow-up on an as-needed basis\par \par Prior to appointment and during encounter with patient extensive medical records were reviewed including but not limited to, hospital records, out patient records, imaging results, and lab data. During this appointment the patient was examined, diagnoses were discussed and explained in a face to face manner. In addition extensive time was spent reviewing aforementioned diagnostic studies. Counseling including abnormal image results, differential diagnoses, treatment options, risk and benefits, lifestyle changes, current condition, and current medications was performed. Patient's comments, questions, and concerns were address and patient verbalized understanding. Based on this patient's presentation at our office, which is an orthopedic spine surgeon's office, this patient inherently / intrinsically has a risk, however minute, of developing  issues such as Cauda equina syndrome, bowel and bladder changes, or progression of motor or neurological deficits such as paralysis which may be permanent.

## 2022-06-10 NOTE — PHYSICAL EXAM
[de-identified] : CONSTITUTIONAL: The patient is a very pleasant individual who is well-nourished and who appears stated age.\par PSYCHIATRIC: The patient is alert and oriented X 3 and in no apparent distress, and participates with orthopedic evaluation well.\par HEAD: Atraumatic and is nonsyndromic in appearance.\par EENT: No visible thyromegaly, EOMI.\par RESPIRATORY: Respiratory rate is regular, not dyspneic on examination.\par LYMPHATICS: There is no inguinal lymphadenopathy\par INTEGUMENTARY: Skin is clean, dry, and intact about the bilateral lower extremities and lumbar spine.\par VASCULAR: There is brisk capillary refill about the bilateral lower extremities.\par NEUROLOGIC: There are no pathologic reflexes. There is no decrease in sensation of the bilateral lower extremities on Wartenberg pinwheel examination. Deep tendon reflexes are well maintained at 2+/4 of the bilateral lower extremities and are symmetric.\par MUSCULOSKELETAL: There is no visible muscular atrophy. Manual motor strength is well maintained in the bilateral lower extremities. Range of motion of lumbar spine is well maintained. Negative tension sign and straight leg raise bilaterally. Quad extension, ankle dorsiflexion, EHL, plantar flexion, and ankle eversion are well preserved. Normal secondary orthopaedic exam of bilateral hips, greater trochanteric area, knees and ankles. No pain with internal or external rotation of the bilateral hips.  physical exam on today's date has no signs or symptoms of worsening neurogenic claudication patient does have focal right gluteal tendinopathy and right greater trochanteric bursitis [de-identified] : AP and Lateral views of the lumbar spine taken 06/10/2022 demonstrates Mild grade 1 spondylolisthesis at L4-L5.\par  MRI has been reviewed from   radiology this is dated October 2020 it does show severe spinal stenosis mild grade 1 spondylolisthesis and lumbar degenerative disc disease at L4-L5.

## 2022-06-10 NOTE — HISTORY OF PRESENT ILLNESS
[Worsening] : worsening [10] : a maximum pain level of 10/10 [Standing] : worsened by standing [Rest] : relieved by rest [de-identified] : 66 year old F presents for an initial evaluation of lower back pain and lumbar stenosis. Patient states she has had  management provided by her bone health doctor Dr. Lewis.  Upon questioning for neurogenic claudication patient states she has no discomfort when she stands and walks she states she actually feels better she isolates her pain and discomfort on today's examination to her right hip.  And she states it only bothers her when she goes to get up from a seated position she presents for an MRI for review for review [Ataxia] : no ataxia [Incontinence] : no incontinence 2018 [Loss of Dexterity] : good dexterity [Urinary Ret.] : no urinary retention

## 2022-07-25 ENCOUNTER — NON-APPOINTMENT (OUTPATIENT)
Age: 67
End: 2022-07-25

## 2022-08-18 ENCOUNTER — APPOINTMENT (OUTPATIENT)
Dept: CARDIOLOGY | Facility: CLINIC | Age: 67
End: 2022-08-18

## 2022-08-18 ENCOUNTER — NON-APPOINTMENT (OUTPATIENT)
Age: 67
End: 2022-08-18

## 2022-08-18 VITALS
SYSTOLIC BLOOD PRESSURE: 130 MMHG | TEMPERATURE: 99.7 F | HEIGHT: 66 IN | WEIGHT: 206 LBS | OXYGEN SATURATION: 95 % | HEART RATE: 90 BPM | BODY MASS INDEX: 33.11 KG/M2 | DIASTOLIC BLOOD PRESSURE: 82 MMHG

## 2022-08-18 PROCEDURE — 99215 OFFICE O/P EST HI 40 MIN: CPT | Mod: 25

## 2022-08-18 PROCEDURE — 93000 ELECTROCARDIOGRAM COMPLETE: CPT

## 2022-09-06 ENCOUNTER — APPOINTMENT (OUTPATIENT)
Dept: FAMILY MEDICINE | Facility: CLINIC | Age: 67
End: 2022-09-06

## 2022-09-07 ENCOUNTER — APPOINTMENT (OUTPATIENT)
Dept: CARDIOLOGY | Facility: CLINIC | Age: 67
End: 2022-09-07

## 2022-09-07 PROCEDURE — 93306 TTE W/DOPPLER COMPLETE: CPT

## 2022-10-01 NOTE — HISTORY OF PRESENT ILLNESS
Pt was able to take oral K+ with water and is receiving IV K+ supplementation. Call light within reach, resting comfortably, all needs met at this time.     [id number]   [FreeTextEntry1] : WELLNESS EXAM [de-identified] : PRESENTING FOR YEARLY WELLNESS EXAM.  IS WALKING AND GYM 2 - 3 DAYS A WEEK.  STATIONARY BIKE.  DIET IS "WELL".  OVERDUE TO SEE SURGERY DR. VALENCIA IN FOLLOW-UP OF RIGHT LOWER ABDOMINAL WALL.  ALSO MISSED LAST APPOINTMENT SCHEDULED WITH CARDIOLOGY.  TAKING ALL MEDICATIONS AS DIRECTED.  UP TO DATE WITH GYN, MAMMOGRAM, COLONOSCOPY AND BONE DENSITY.  \par \par GYN: DR. CORBETT\par PULMONOLOGY: DR. RIZO\par CARDIOLOGY: DR. MORAES\par SURGERY: DR. VALENCIA\par OPHTHALMOLOGY: DR. DOMINGO - YEARLY

## 2022-10-17 ENCOUNTER — APPOINTMENT (OUTPATIENT)
Dept: FAMILY MEDICINE | Facility: CLINIC | Age: 67
End: 2022-10-17

## 2022-10-17 ENCOUNTER — LABORATORY RESULT (OUTPATIENT)
Age: 67
End: 2022-10-17

## 2022-10-17 ENCOUNTER — APPOINTMENT (OUTPATIENT)
Dept: CARDIOLOGY | Facility: CLINIC | Age: 67
End: 2022-10-17

## 2022-10-17 ENCOUNTER — NON-APPOINTMENT (OUTPATIENT)
Age: 67
End: 2022-10-17

## 2022-10-17 VITALS
BODY MASS INDEX: 34.39 KG/M2 | WEIGHT: 214 LBS | OXYGEN SATURATION: 97 % | SYSTOLIC BLOOD PRESSURE: 122 MMHG | HEART RATE: 85 BPM | DIASTOLIC BLOOD PRESSURE: 72 MMHG | HEIGHT: 66 IN

## 2022-10-17 VITALS
HEART RATE: 81 BPM | WEIGHT: 214 LBS | HEIGHT: 66 IN | BODY MASS INDEX: 34.39 KG/M2 | DIASTOLIC BLOOD PRESSURE: 82 MMHG | OXYGEN SATURATION: 94 % | SYSTOLIC BLOOD PRESSURE: 131 MMHG | TEMPERATURE: 98.3 F

## 2022-10-17 DIAGNOSIS — M21.611 BUNION OF RIGHT FOOT: ICD-10-CM

## 2022-10-17 DIAGNOSIS — J45.909 UNSPECIFIED ASTHMA, UNCOMPLICATED: ICD-10-CM

## 2022-10-17 DIAGNOSIS — Z01.810 ENCOUNTER FOR PREPROCEDURAL CARDIOVASCULAR EXAMINATION: ICD-10-CM

## 2022-10-17 DIAGNOSIS — E78.5 HYPERLIPIDEMIA, UNSPECIFIED: ICD-10-CM

## 2022-10-17 DIAGNOSIS — Z01.818 ENCOUNTER FOR OTHER PREPROCEDURAL EXAMINATION: ICD-10-CM

## 2022-10-17 PROCEDURE — 99214 OFFICE O/P EST MOD 30 MIN: CPT | Mod: 25

## 2022-10-17 PROCEDURE — 36415 COLL VENOUS BLD VENIPUNCTURE: CPT

## 2022-10-17 PROCEDURE — 99215 OFFICE O/P EST HI 40 MIN: CPT | Mod: 25

## 2022-10-17 PROCEDURE — 93000 ELECTROCARDIOGRAM COMPLETE: CPT

## 2022-10-18 LAB
ALBUMIN SERPL ELPH-MCNC: 4.7 G/DL
ALP BLD-CCNC: 74 U/L
ALT SERPL-CCNC: 18 U/L
ANION GAP SERPL CALC-SCNC: 11 MMOL/L
APTT BLD: 28.3 SEC
AST SERPL-CCNC: 28 U/L
BASOPHILS # BLD AUTO: 0.01 K/UL
BASOPHILS NFR BLD AUTO: 0.2 %
BILIRUB SERPL-MCNC: 0.3 MG/DL
BUN SERPL-MCNC: 19 MG/DL
CALCIUM SERPL-MCNC: 9.6 MG/DL
CHLORIDE SERPL-SCNC: 102 MMOL/L
CO2 SERPL-SCNC: 29 MMOL/L
CREAT SERPL-MCNC: 0.76 MG/DL
EGFR: 86 ML/MIN/1.73M2
EOSINOPHIL # BLD AUTO: 0.27 K/UL
EOSINOPHIL NFR BLD AUTO: 6 %
GLUCOSE SERPL-MCNC: 70 MG/DL
HCT VFR BLD CALC: 42.2 %
HGB BLD-MCNC: 13.4 G/DL
IMM GRANULOCYTES NFR BLD AUTO: 0 %
INR PPP: 1.03 RATIO
LYMPHOCYTES # BLD AUTO: 1.94 K/UL
LYMPHOCYTES NFR BLD AUTO: 43.3 %
MAN DIFF?: NORMAL
MCHC RBC-ENTMCNC: 31.8 GM/DL
MCHC RBC-ENTMCNC: 34 PG
MCV RBC AUTO: 107.1 FL
MONOCYTES # BLD AUTO: 0.36 K/UL
MONOCYTES NFR BLD AUTO: 8 %
NEUTROPHILS # BLD AUTO: 1.9 K/UL
NEUTROPHILS NFR BLD AUTO: 42.5 %
PLATELET # BLD AUTO: 242 K/UL
POTASSIUM SERPL-SCNC: 4 MMOL/L
PROT SERPL-MCNC: 7.5 G/DL
PT BLD: 12.1 SEC
RBC # BLD: 3.94 M/UL
RBC # FLD: 12.1 %
SODIUM SERPL-SCNC: 142 MMOL/L
WBC # FLD AUTO: 4.48 K/UL

## 2022-10-18 NOTE — PHYSICAL EXAM
[No Acute Distress] : no acute distress [Well Nourished] : well nourished [Well-Appearing] : well-appearing [Normal Sclera/Conjunctiva] : normal sclera/conjunctiva [PERRL] : pupils equal round and reactive to light [Normal Outer Ear/Nose] : the outer ears and nose were normal in appearance [Normal Oropharynx] : the oropharynx was normal [Normal TMs] : both tympanic membranes were normal [No Lymphadenopathy] : no lymphadenopathy [Supple] : supple [No Respiratory Distress] : no respiratory distress  [No Accessory Muscle Use] : no accessory muscle use [Clear to Auscultation] : lungs were clear to auscultation bilaterally [Normal Rate] : normal rate  [Regular Rhythm] : with a regular rhythm [Normal S1, S2] : normal S1 and S2 [Soft] : abdomen soft [Non Tender] : non-tender [Normal Bowel Sounds] : normal bowel sounds [No Joint Swelling] : no joint swelling [No Rash] : no rash [Coordination Grossly Intact] : coordination grossly intact [No Focal Deficits] : no focal deficits [Deep Tendon Reflexes (DTR)] : deep tendon reflexes were 2+ and symmetric [Speech Grossly Normal] : speech grossly normal [Memory Grossly Normal] : memory grossly normal [Normal Mood] : the mood was normal [de-identified] : right bunion first toe with second toe on right foot overlapping first

## 2022-10-18 NOTE — PLAN
[FreeTextEntry1] : OPTIMIZED MEDICALLY FOR PROPOSED SURGICAL PROCEDURE PENDING PRE-OPERATIVE TESTING REVIEW\par HAD EKG WITH CARDIOLOGY.  NEEDS CARDIOLOGY CLEARANCE\par GI/DVT PROPHYLAXIS PER SURGERY\par AVOIDANCE OF NSAIDS, VITAMINS AND ASPIRIN CONTAINING PRODUCTS PRIOR TO SURGERY\par CARDIOLOGY CONSULTANT NOTE APPRECIATED FROM LAST MONTH AS WELL AS ECHOCARDIOGRAM\par CALL WITH ANY QUESTIONS, CONCERNS OR CHANGES PRIOR TO PROCEDURE\par SUPPORT PROVIDED\par FOLLOW-UP POST-OPERATIVELY \par ASTHMA ACTION PLAN REVIEWED\par WILL HAVE FLU VACCINE POST PROCEDURE\par \par ADDENDUM: 10/18/22; PRE-OPERATIVE TESTING REVIEWED.  OPTIMIZED MEDICALLY FOR PROPOSED PROCEDURE.  SEE CARDIOLOGY CLEARANCE AND RECOMMENDATIONS.

## 2022-10-18 NOTE — HISTORY OF PRESENT ILLNESS
[Coronary Artery Disease] : coronary artery disease [Asthma] : asthma [No Adverse Anesthesia Reaction] : no adverse anesthesia reaction in self or family member [(Patient denies any chest pain, claudication, dyspnea on exertion, orthopnea, palpitations or syncope)] : Patient denies any chest pain, claudication, dyspnea on exertion, orthopnea, palpitations or syncope [Aortic Stenosis] : no aortic stenosis [Atrial Fibrillation] : no atrial fibrillation [Recent Myocardial Infarction] : no recent myocardial infarction [Implantable Device/Pacemaker] : no implantable device/pacemaker [COPD] : no COPD [Sleep Apnea] : no sleep apnea [Smoker] : not a smoker [Chronic Anticoagulation] : no chronic anticoagulation [Chronic Kidney Disease] : no chronic kidney disease [Diabetes] : no diabetes [FreeTextEntry1] : RIGHT BUNIONECTOMY [FreeTextEntry2] : OCTOBER 19, 2022 [FreeTextEntry3] : DR. VINSON [FreeTextEntry4] : MS. SO IS A PLEASANT 68 YO PRESENTING FOR MEDICAL CLEARANCE FOR UPCOMING FOOT SURGERY.  HISTORY OF BUNION RIGHT FOOT.  CAUSES HER DISCOMFORT.  FEELING WELL TODAY.  NO HISTORY OF STROKE OR SEIZURE.  HISTORY OF CAD, NSTEMI IN 2015, HYPERTENSION AND HYPERLIPIDEMIA.  ROUTINELY FOLLOWS WITH CARDIOLOGY.  HAD ECHOCARDIOGRAM LAST MONTH.  NO PERSONAL OR FAMILY HISTORY OF BLOOD OR CLOTTING DISORDERS.  DENIES EASY BLEEDING OR BRUISING.  IS ABLE TO WALK MULTIPLE BLOCKS AND GO UP MULTIPLE FLIGHTS OF STAIRS WITHOUT DIFFICULTY.   HAS HAD NO HEADACHE, DIZZINESS, CHEST PAIN, SHORTNESS OF BREATH, GI OR URINARY COMPLAINTS.  NO OTHER COMPLAINTS TODAY. CHRONIC HISTORY OF ASTHMA WELL CONTROLLED ON DULERA.  HAS NOT NEEDED RESCUE INHALER IN WEEKS.  IS TO REPEAT BONE DENSITY WITH HER GYN IN JANUARY WHEN DUE. [FreeTextEntry7] : ECHOCARDIOGRAM SEPTEMBER 2022

## 2022-10-26 ENCOUNTER — LABORATORY RESULT (OUTPATIENT)
Age: 67
End: 2022-10-26

## 2022-10-26 ENCOUNTER — APPOINTMENT (OUTPATIENT)
Dept: FAMILY MEDICINE | Facility: CLINIC | Age: 67
End: 2022-10-26

## 2022-10-26 VITALS
BODY MASS INDEX: 34.07 KG/M2 | DIASTOLIC BLOOD PRESSURE: 64 MMHG | OXYGEN SATURATION: 100 % | HEART RATE: 70 BPM | HEIGHT: 66 IN | SYSTOLIC BLOOD PRESSURE: 122 MMHG | WEIGHT: 212 LBS

## 2022-10-26 DIAGNOSIS — Z23 ENCOUNTER FOR IMMUNIZATION: ICD-10-CM

## 2022-10-26 PROCEDURE — 36415 COLL VENOUS BLD VENIPUNCTURE: CPT

## 2022-10-26 PROCEDURE — 90662 IIV NO PRSV INCREASED AG IM: CPT

## 2022-10-26 PROCEDURE — 99397 PER PM REEVAL EST PAT 65+ YR: CPT | Mod: 25

## 2022-10-26 PROCEDURE — G0444 DEPRESSION SCREEN ANNUAL: CPT | Mod: NC,59

## 2022-10-26 PROCEDURE — G0008: CPT

## 2022-10-26 RX ORDER — CALCIUM CITRATE/VITAMIN D3 315MG-6.25
TABLET ORAL
Refills: 0 | Status: ACTIVE | COMMUNITY

## 2022-10-26 RX ORDER — UBIDECARENONE/VIT E ACET 100MG-5
CAPSULE ORAL
Refills: 0 | Status: ACTIVE | COMMUNITY

## 2022-10-26 RX ORDER — PNV NO.95/FERROUS FUM/FOLIC AC 28MG-0.8MG
TABLET ORAL
Refills: 0 | Status: ACTIVE | COMMUNITY

## 2022-10-30 PROBLEM — Z23 FLU VACCINE NEED: Status: ACTIVE | Noted: 2022-10-26

## 2022-10-30 NOTE — HEALTH RISK ASSESSMENT
[Good] : ~his/her~  mood as  good [Never] : Never [No] : In the past 12 months have you used drugs other than those required for medical reasons? No [No falls in past year] : Patient reported no falls in the past year [0] : 2) Feeling down, depressed, or hopeless: Not at all (0) [PHQ-2 Negative - No further assessment needed] : PHQ-2 Negative - No further assessment needed [HIV Test offered] : HIV Test offered [Hepatitis C test offered] : Hepatitis C test offered [None] : None [Alone] : lives alone [Employed] : employed [College] : College [] :  [# Of Children ___] : has [unfilled] children [Feels Safe at Home] : Feels safe at home [Fully functional (bathing, dressing, toileting, transferring, walking, feeding)] : Fully functional (bathing, dressing, toileting, transferring, walking, feeding) [Fully functional (using the telephone, shopping, preparing meals, housekeeping, doing laundry, using] : Fully functional and needs no help or supervision to perform IADLs (using the telephone, shopping, preparing meals, housekeeping, doing laundry, using transportation, managing medications and managing finances) [Reports normal functional visual acuity (ie: able to read med bottle)] : Reports normal functional visual acuity [Smoke Detector] : smoke detector [Carbon Monoxide Detector] : carbon monoxide detector [Safety elements used in home] : safety elements used in home [Seat Belt] :  uses seat belt [Sunscreen] : uses sunscreen [With Patient/Caregiver] : , with patient/caregiver [de-identified] : recent right foot surgery [de-identified] : not currently exercising due to recent foot surgery but usually walks [de-identified] : diet is "good".  avoids fried foods and sweets.  [BUR8Frnmw] : 0 [Change in mental status noted] : No change in mental status noted [Language] : denies difficulty with language [Learning/Retaining New Information] : denies difficulty learning/retaining new information [Handling Complex Tasks] : denies difficulty handling complex tasks [Sexually Active] : not sexually active [High Risk Behavior] : no high risk behavior [Reports changes in hearing] : Reports no changes in hearing [Reports changes in vision] : Reports no changes in vision [Reports changes in dental health] : Reports no changes in dental health [MammogramDate] : 03/22 [PapSmearDate] : 2020 [BoneDensityDate] : 2021 [BoneDensityComments] : was advised "good" by gyn and on calcium + D per GYN; SEES RHEUMATOLOGY [ColonoscopyDate] : 01/21 [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

## 2022-10-30 NOTE — PHYSICAL EXAM
[No Acute Distress] : no acute distress [Well Nourished] : well nourished [Well-Appearing] : well-appearing [Normal Sclera/Conjunctiva] : normal sclera/conjunctiva [PERRL] : pupils equal round and reactive to light [Normal Outer Ear/Nose] : the outer ears and nose were normal in appearance [Normal Oropharynx] : the oropharynx was normal [Normal TMs] : both tympanic membranes were normal [No Lymphadenopathy] : no lymphadenopathy [Supple] : supple [No Respiratory Distress] : no respiratory distress  [No Accessory Muscle Use] : no accessory muscle use [Clear to Auscultation] : lungs were clear to auscultation bilaterally [Normal Rate] : normal rate  [Regular Rhythm] : with a regular rhythm [Normal S1, S2] : normal S1 and S2 [Soft] : abdomen soft [Non Tender] : non-tender [Normal Bowel Sounds] : normal bowel sounds [Grossly Normal Strength/Tone] : grossly normal strength/tone [No Rash] : no rash [Coordination Grossly Intact] : coordination grossly intact [No Focal Deficits] : no focal deficits [Normal Gait] : normal gait [Deep Tendon Reflexes (DTR)] : deep tendon reflexes were 2+ and symmetric [Speech Grossly Normal] : speech grossly normal [Memory Grossly Normal] : memory grossly normal [Normal Mood] : the mood was normal [de-identified] : FOOT WRAPPED - DEFERED TO SURGEON

## 2022-10-30 NOTE — PLAN
[FreeTextEntry1] : F/U OPHTHALMOLOGY\par VISION SCREENING\par SAFETY / HEALTHY HABITS REVIEWED\par HEALTHY DIET AND LIFESTYLE MODIFICATIONS\par VACCINATIONS DISCUSSED: COVID, PNEUMONIA, TDAP AND SHINGRIX\par FLU VACCINE GIVEN AND TOLERATED WELL\par CHECK ROUTINE LAB WORK\par FOLLOW-UP ALL SPECIALISTS AS DIRECTED\par ROUTINE GYN EXAMS\par CALL WITH ANY QUESTIONS, CONCERNS OR CHANGES

## 2022-10-30 NOTE — HISTORY OF PRESENT ILLNESS
[FreeTextEntry1] : PHYSICAL [de-identified] : MS. SO IS A PLEASANT 66 YO PRESENTING FOR YEARLY PHYSICAL.  FOOT SURGERY WENT WELL AND IS FOLLOWING UP WITH HER PODIATRIST.  SAW OPHTHALMOLOGY THIS MONTH.  CHRONIC HISTORY OF ASTHMA CONTROLLED.

## 2023-02-10 LAB
25(OH)D3 SERPL-MCNC: 34.8 NG/ML
APPEARANCE: ABNORMAL
BILIRUBIN URINE: NEGATIVE
BLOOD URINE: NEGATIVE
CHOLEST SERPL-MCNC: 172 MG/DL
COLOR: YELLOW
ESTIMATED AVERAGE GLUCOSE: 108 MG/DL
GLUCOSE QUALITATIVE U: NEGATIVE
HBA1C MFR BLD HPLC: 5.4 %
HCV AB SER QL: REACTIVE
HCV S/CO RATIO: 6.53 S/CO
HDLC SERPL-MCNC: 53 MG/DL
HIV1+2 AB SPEC QL IA.RAPID: NONREACTIVE
KETONES URINE: NEGATIVE
LDLC SERPL CALC-MCNC: 85 MG/DL
LEUKOCYTE ESTERASE URINE: NEGATIVE
NITRITE URINE: NEGATIVE
NONHDLC SERPL-MCNC: 119 MG/DL
PH URINE: 8.5
PROTEIN URINE: NORMAL
SPECIFIC GRAVITY URINE: 1.02
T4 FREE SERPL-MCNC: 1.3 NG/DL
TRIGL SERPL-MCNC: 171 MG/DL
TSH SERPL-ACNC: 1.27 UIU/ML
UROBILINOGEN URINE: NORMAL

## 2023-08-31 ENCOUNTER — APPOINTMENT (OUTPATIENT)
Dept: CARDIOLOGY | Facility: CLINIC | Age: 68
End: 2023-08-31
Payer: COMMERCIAL

## 2023-08-31 ENCOUNTER — NON-APPOINTMENT (OUTPATIENT)
Age: 68
End: 2023-08-31

## 2023-08-31 VITALS
SYSTOLIC BLOOD PRESSURE: 132 MMHG | DIASTOLIC BLOOD PRESSURE: 82 MMHG | OXYGEN SATURATION: 96 % | WEIGHT: 214 LBS | HEART RATE: 83 BPM | TEMPERATURE: 99.1 F | BODY MASS INDEX: 34.39 KG/M2 | HEIGHT: 66 IN

## 2023-08-31 DIAGNOSIS — I25.10 ATHEROSCLEROTIC HEART DISEASE OF NATIVE CORONARY ARTERY W/OUT ANGINA PECTORIS: ICD-10-CM

## 2023-08-31 DIAGNOSIS — I10 ESSENTIAL (PRIMARY) HYPERTENSION: ICD-10-CM

## 2023-08-31 PROCEDURE — 99214 OFFICE O/P EST MOD 30 MIN: CPT | Mod: 25

## 2023-08-31 PROCEDURE — 93000 ELECTROCARDIOGRAM COMPLETE: CPT

## 2023-08-31 RX ORDER — MELOXICAM 15 MG/1
15 TABLET ORAL
Qty: 10 | Refills: 0 | Status: DISCONTINUED | COMMUNITY
Start: 2022-10-19 | End: 2023-08-31

## 2023-08-31 RX ORDER — MOMETASONE FUROATE AND FORMOTEROL FUMARATE DIHYDRATE 200; 5 UG/1; UG/1
200-5 AEROSOL RESPIRATORY (INHALATION)
Qty: 39 | Refills: 0 | Status: DISCONTINUED | COMMUNITY
Start: 2020-03-17 | End: 2023-08-31

## 2023-08-31 RX ORDER — GABAPENTIN 600 MG/1
600 TABLET, COATED ORAL TWICE DAILY
Qty: 180 | Refills: 0 | Status: DISCONTINUED | COMMUNITY
End: 2023-08-31

## 2023-08-31 NOTE — HISTORY OF PRESENT ILLNESS
[FreeTextEntry1] : Previous note: Patient with a history of CAD , NSTEMI, HTN, HLD, who presents for routine follow up. She denies CP, SOB at rest, palpitations, dizziness, lightheadedness, activity intolerance, syncope or near syncope. She reports she is compliant with her medications. She exercises on treadmill/ ash 2-3x/week, without CP. She does admit SOB with moderate activity like climbing multiple stairs at the train station and with increased speed at the gym x few months.   1/2020- stress test- normal. Poor exercise tolerance. Normal PFTs per patient. Underwent CPET. deconditioning.   8/2022- Patient stable. No chest pain.   10/17/2022: Patient presents to the office for preoperative cardiovascular assessment for upcoming right bunionectomy. Reports feeling well today. Denies chest pain, SOB at rest, GARCIA, palpitations, lightheadedness, dizziness, fatigue, syncope, near syncope and LE edema. Denies smoking, excessive alcohol and illicit drug use. States she walks 1/2 mile three times per week, leisurely lace. States she can walk up 2 flights of stairs.   8/2023- Doing well, no chest pain. No Sob.

## 2023-08-31 NOTE — DISCUSSION/SUMMARY
[FreeTextEntry1] : 1. CAD- Will plan for exercise stress testing.  2. Htn- has noticed slightly elevated blood pressure. No weight gain. Blood pressure diary at home. If notices elevated pressures regularly, would consider adding medications.  3. Follow up in 1 year.  [EKG obtained to assist in diagnosis and management of assessed problem(s)] : EKG obtained to assist in diagnosis and management of assessed problem(s)

## 2023-09-22 ENCOUNTER — APPOINTMENT (OUTPATIENT)
Dept: CARDIOLOGY | Facility: CLINIC | Age: 68
End: 2023-09-22
Payer: COMMERCIAL

## 2023-09-22 PROCEDURE — 93015 CV STRESS TEST SUPVJ I&R: CPT

## 2023-11-06 ENCOUNTER — LABORATORY RESULT (OUTPATIENT)
Age: 68
End: 2023-11-06

## 2023-11-06 ENCOUNTER — APPOINTMENT (OUTPATIENT)
Dept: FAMILY MEDICINE | Facility: CLINIC | Age: 68
End: 2023-11-06
Payer: COMMERCIAL

## 2023-11-06 VITALS
SYSTOLIC BLOOD PRESSURE: 126 MMHG | WEIGHT: 214 LBS | DIASTOLIC BLOOD PRESSURE: 86 MMHG | HEIGHT: 66 IN | OXYGEN SATURATION: 98 % | HEART RATE: 84 BPM | BODY MASS INDEX: 34.39 KG/M2

## 2023-11-06 DIAGNOSIS — M54.50 LOW BACK PAIN, UNSPECIFIED: ICD-10-CM

## 2023-11-06 DIAGNOSIS — L03.011 CELLULITIS OF RIGHT FINGER: ICD-10-CM

## 2023-11-06 DIAGNOSIS — M48.061 SPINAL STENOSIS, LUMBAR REGION WITHOUT NEUROGENIC CLAUDICATION: ICD-10-CM

## 2023-11-06 DIAGNOSIS — M47.816 SPONDYLOSIS W/OUT MYELOPATHY OR RADICULOPATHY, LUMBAR REGION: ICD-10-CM

## 2023-11-06 DIAGNOSIS — E66.9 OBESITY, UNSPECIFIED: ICD-10-CM

## 2023-11-06 DIAGNOSIS — Z00.00 ENCOUNTER FOR GENERAL ADULT MEDICAL EXAMINATION W/OUT ABNORMAL FINDINGS: ICD-10-CM

## 2023-11-06 DIAGNOSIS — G89.29 LOW BACK PAIN, UNSPECIFIED: ICD-10-CM

## 2023-11-06 PROCEDURE — 36415 COLL VENOUS BLD VENIPUNCTURE: CPT

## 2023-11-06 PROCEDURE — 99213 OFFICE O/P EST LOW 20 MIN: CPT | Mod: 25

## 2023-11-06 PROCEDURE — 99397 PER PM REEVAL EST PAT 65+ YR: CPT | Mod: 25

## 2023-11-06 RX ORDER — ALBUTEROL SULFATE 90 UG/1
108 (90 BASE) INHALANT RESPIRATORY (INHALATION)
Qty: 3 | Refills: 1 | Status: ACTIVE | COMMUNITY
Start: 2018-03-29 | End: 1900-01-01

## 2023-11-06 RX ORDER — MOMETASONE FUROATE AND FORMOTEROL FUMARATE DIHYDRATE 50; 5 UG/1; UG/1
AEROSOL RESPIRATORY (INHALATION)
Refills: 0 | Status: ACTIVE | COMMUNITY

## 2023-11-07 LAB
25(OH)D3 SERPL-MCNC: 38 NG/ML
ALBUMIN SERPL ELPH-MCNC: 4.5 G/DL
ALP BLD-CCNC: 66 U/L
ALT SERPL-CCNC: 23 U/L
ANION GAP SERPL CALC-SCNC: 13 MMOL/L
APPEARANCE: CLEAR
AST SERPL-CCNC: 34 U/L
BILIRUB SERPL-MCNC: 0.3 MG/DL
BILIRUBIN URINE: NEGATIVE
BLOOD URINE: NEGATIVE
BUN SERPL-MCNC: 21 MG/DL
CALCIUM SERPL-MCNC: 9.6 MG/DL
CHLORIDE SERPL-SCNC: 105 MMOL/L
CHOLEST SERPL-MCNC: 151 MG/DL
CO2 SERPL-SCNC: 26 MMOL/L
COLOR: YELLOW
CREAT SERPL-MCNC: 0.69 MG/DL
EGFR: 95 ML/MIN/1.73M2
ESTIMATED AVERAGE GLUCOSE: 111 MG/DL
GLUCOSE QUALITATIVE U: NEGATIVE MG/DL
GLUCOSE SERPL-MCNC: 87 MG/DL
HBA1C MFR BLD HPLC: 5.5 %
HDLC SERPL-MCNC: 54 MG/DL
KETONES URINE: NEGATIVE MG/DL
LDLC SERPL CALC-MCNC: 81 MG/DL
LEUKOCYTE ESTERASE URINE: ABNORMAL
NITRITE URINE: NEGATIVE
NONHDLC SERPL-MCNC: 97 MG/DL
PH URINE: 7.5
POTASSIUM SERPL-SCNC: 4 MMOL/L
PROT SERPL-MCNC: 7.5 G/DL
PROTEIN URINE: NORMAL MG/DL
SODIUM SERPL-SCNC: 143 MMOL/L
SPECIFIC GRAVITY URINE: 1.03
T4 FREE SERPL-MCNC: 1.1 NG/DL
TRIGL SERPL-MCNC: 88 MG/DL
TSH SERPL-ACNC: 1.23 UIU/ML
UROBILINOGEN URINE: 0.2 MG/DL

## 2023-11-12 PROBLEM — M47.816 LUMBAR SPONDYLOSIS: Status: ACTIVE | Noted: 2022-05-31

## 2023-11-12 PROBLEM — M48.061 LUMBAR STENOSIS WITHOUT NEUROGENIC CLAUDICATION: Status: ACTIVE | Noted: 2022-06-10

## 2023-11-12 PROBLEM — M54.50 CHRONIC LOW BACK PAIN WITHOUT SCIATICA, UNSPECIFIED BACK PAIN LATERALITY: Status: ACTIVE | Noted: 2023-11-12

## 2023-11-13 LAB
BASOPHILS # BLD AUTO: 0.02 K/UL
BASOPHILS NFR BLD AUTO: 0.4 %
EOSINOPHIL # BLD AUTO: 0.34 K/UL
EOSINOPHIL NFR BLD AUTO: 7.2 %
HCT VFR BLD CALC: 42.6 %
HGB BLD-MCNC: 13.5 G/DL
IMM GRANULOCYTES NFR BLD AUTO: 0.2 %
LYMPHOCYTES # BLD AUTO: 2.14 K/UL
LYMPHOCYTES NFR BLD AUTO: 45.6 %
MAN DIFF?: NORMAL
MCHC RBC-ENTMCNC: 31.7 GM/DL
MCHC RBC-ENTMCNC: 33.6 PG
MCV RBC AUTO: 106 FL
MONOCYTES # BLD AUTO: 0.34 K/UL
MONOCYTES NFR BLD AUTO: 7.2 %
NEUTROPHILS # BLD AUTO: 1.84 K/UL
NEUTROPHILS NFR BLD AUTO: 39.4 %
PLATELET # BLD AUTO: 206 K/UL
RBC # BLD: 4.02 M/UL
RBC # FLD: 13.2 %
WBC # FLD AUTO: 4.69 K/UL

## 2023-11-17 LAB — VIT B12 SERPL-MCNC: >2000 PG/ML

## 2023-12-22 LAB — VIT B12 SERPL-MCNC: 1697 PG/ML

## 2024-03-05 LAB — HEMOCCULT STL QL IA: NEGATIVE

## 2024-03-19 ENCOUNTER — APPOINTMENT (OUTPATIENT)
Dept: OBGYN | Facility: CLINIC | Age: 69
End: 2024-03-19
Payer: COMMERCIAL

## 2024-03-19 VITALS
WEIGHT: 205 LBS | SYSTOLIC BLOOD PRESSURE: 135 MMHG | HEIGHT: 66 IN | BODY MASS INDEX: 32.95 KG/M2 | DIASTOLIC BLOOD PRESSURE: 81 MMHG

## 2024-03-19 DIAGNOSIS — Z01.419 ENCOUNTER FOR GYNECOLOGICAL EXAMINATION (GENERAL) (ROUTINE) W/OUT ABNORMAL FINDINGS: ICD-10-CM

## 2024-03-19 PROCEDURE — 99397 PER PM REEVAL EST PAT 65+ YR: CPT

## 2024-03-19 NOTE — PHYSICAL EXAM
[Appropriately responsive] : appropriately responsive [Alert] : alert [No Acute Distress] : no acute distress [Soft] : soft [Non-tender] : non-tender [Non-distended] : non-distended [No HSM] : No HSM [No Lesions] : no lesions [No Mass] : no mass [Oriented x3] : oriented x3 [Examination Of The Breasts] : a normal appearance [No Masses] : no breast masses were palpable [Labia Majora] : normal [Labia Minora] : normal [Atrophy] : atrophy [Normal] : normal [Cystocele] : a cystocele [FreeTextEntry4] : Grade 3 cystocele, more prominent on Valsalva

## 2024-03-19 NOTE — PLAN
[FreeTextEntry1] : Clinical breast exam performed and self breast exam explained and patient was handed in Rx for her yearly mammogram which is due in April.  She does not require cervical Pap smears as she is over the age of 65 and has never had an abnormal cervical Pap smear.  She does have an additional concern regarding a cystocele/possible prolapse.  Subjective history and physical exam significant for a cystocele which is more prominent on Valsalva and after risk-benefit alternatives were discussed she was handed information for a urogynecologist for assessment and treatment options.  Patient would like to avoid surgical treatment.  She is followed by a primary care physician, cardiology and should maintain these appointments as scheduled.    Patient expressed reluctance to return to our office and she was directed as long as her mammograms, bone mineral density, and concerns are being addressed at an outside office she does not have to return to ours if she does not wish to.  She does not require cervical Pap smears.  If she has any gynecologic concerns she may return to our office or another GYN provider, if she desires.

## 2024-03-19 NOTE — HISTORY OF PRESENT ILLNESS
[FreeTextEntry1] : 68-year-old female -0-2-3 presenting for her annual well woman appointment.  She would like an Rx for her mammogram which is due in April.  Her last mammogram was 2023 resulting BI-RADS Category 1.  She has also had a DEXA scan which was on 2023 significant for osteopenia and she has been placed on the appropriate medications and has follow-up scheduled.  Patient reports having a colonoscopy 2 or 3 years prior which resulted negative.  She is followed by cardiology for CAD, NSTEMI, hypertension.  She is followed by her primary care physician and also has a history significant for HDL.  She no longer requires cervical Pap smears after the age of 65, but does have a concern regarding her bladder.  She feels as though it is falling out and has a complaint of urgency.  She has not been assessed by urologist.  Obstetrical history of 1 vaginal delivery followed via  section followed by a vaginal birth after  section.  She has a history of 2 miscarriages which required dilation and curettage.  Denies gynecologic history of uterine fibroids, ovarian cyst, STIs, abnormal Pap smears.  Medical history significant for CAD, hypertension, asthma and spinal stenosis.  Additional surgical history of hernia repair, and a cholecystectomy.  Denies family history of gynecologic, breast, colon cancer.  Denies alcohol, tobacco, illicit drug use.  Denies allergies to medications.

## 2024-03-19 NOTE — COUNSELING
[Nutrition/ Exercise/ Weight Management] : nutrition, exercise, weight management [Vitamins/Supplements] : vitamins/supplements [Confidentiality] : confidentiality [Breast Self Exam] : breast self exam [Medication Management] : medication management

## 2024-04-26 ENCOUNTER — NON-APPOINTMENT (OUTPATIENT)
Age: 69
End: 2024-04-26

## 2024-05-09 PROBLEM — R39.15 URINARY URGENCY: Status: ACTIVE | Noted: 2024-05-09

## 2024-05-09 PROBLEM — R35.0 URINARY FREQUENCY: Status: ACTIVE | Noted: 2024-05-09

## 2024-05-09 RX ORDER — OMEPRAZOLE 40 MG/1
40 CAPSULE, DELAYED RELEASE ORAL
Qty: 90 | Refills: 0 | Status: DISCONTINUED | COMMUNITY
Start: 2022-08-16 | End: 2024-05-09

## 2024-05-16 ENCOUNTER — APPOINTMENT (OUTPATIENT)
Dept: UROGYNECOLOGY | Facility: CLINIC | Age: 69
End: 2024-05-16
Payer: COMMERCIAL

## 2024-05-16 VITALS
BODY MASS INDEX: 31.82 KG/M2 | HEART RATE: 90 BPM | HEIGHT: 66 IN | SYSTOLIC BLOOD PRESSURE: 136 MMHG | WEIGHT: 198 LBS | DIASTOLIC BLOOD PRESSURE: 81 MMHG

## 2024-05-16 DIAGNOSIS — R39.15 URGENCY OF URINATION: ICD-10-CM

## 2024-05-16 DIAGNOSIS — R35.0 FREQUENCY OF MICTURITION: ICD-10-CM

## 2024-05-16 DIAGNOSIS — R32 UNSPECIFIED URINARY INCONTINENCE: ICD-10-CM

## 2024-05-16 LAB
BILIRUB UR QL STRIP: NEGATIVE
CLARITY UR: CLEAR
COLLECTION METHOD: NORMAL
GLUCOSE UR-MCNC: NEGATIVE
HCG UR QL: 0.2 EU/DL
HGB UR QL STRIP.AUTO: NEGATIVE
KETONES UR-MCNC: NEGATIVE
LEUKOCYTE ESTERASE UR QL STRIP: NEGATIVE
NITRITE UR QL STRIP: NEGATIVE
PH UR STRIP: 7
PROT UR STRIP-MCNC: NEGATIVE
SP GR UR STRIP: 1.02

## 2024-05-16 PROCEDURE — 81003 URINALYSIS AUTO W/O SCOPE: CPT | Mod: QW

## 2024-05-16 PROCEDURE — 99204 OFFICE O/P NEW MOD 45 MIN: CPT | Mod: 25

## 2024-05-16 PROCEDURE — 51701 INSERT BLADDER CATHETER: CPT | Mod: 59

## 2024-05-16 PROCEDURE — 99214 OFFICE O/P EST MOD 30 MIN: CPT | Mod: 25

## 2024-05-16 NOTE — OB HISTORY
[Abortions ___] : [unfilled] (abortions) [Vaginal ___] : [unfilled] vaginal delivery(s) [Approximately ___ (Month)] : the LMP was approximately [unfilled] month(s) ago [Last Pap Smear ___] : date of last pap smear was on [unfilled] [ ___] : [unfilled]  section delivery(s) [Abnormal Pap Smear] : normal pap smear [Taking Estrogens] : is not taking estrogen replacement [Sexually Active] : is not sexually active [FreeTextEntry1] : largest baby: 7lbs 5oz

## 2024-05-16 NOTE — PHYSICAL EXAM
[Chaperone Present] : A chaperone was present in the examining room during all aspects of the physical examination [32163] : A chaperone was present during the pelvic exam. [No Acute Distress] : in no acute distress [Well developed] : well developed [Well Nourished] : ~L well nourished [Oriented x3] : oriented to person, place, and time [Normal Memory] : ~T memory was ~L unimpaired [Normal Mood/Affect] : mood and affect are normal [No Edema] : ~T edema was not present [Supple] : ~T the neck demonstrated no ~M decrease in suppleness [Scar] : a scar was noted [Warm and Dry] : was warm and dry to touch [Normal Gait] : gait was normal [Normal Appearance] : general appearance was normal [Aa ____] : Aa [unfilled] [Ba ____] : Ba [unfilled] [C ____] : C [unfilled] [GH ____] : GH [unfilled] [PB ____] : PB [unfilled] [TVL ____] : TVL  [unfilled] [Ap ____] : Ap [unfilled] [Bp ____] : Bp [unfilled] [D ____] : D [unfilled] [Post Void Residual ____ml] : post void residual was [unfilled] ml [2] : 2 [Normal] : no abnormalities [FreeTextEntry2] : Margie [Cough] : no cough [Tenderness] : ~T no ~M abdominal tenderness observed [Distended] : not distended [Hernia] : no hernia observed

## 2024-05-16 NOTE — DISCUSSION/SUMMARY
[FreeTextEntry1] : Ms. SO is 68 with urgency, leaking of urine, midline cystocele. We talked about the etiology and natural progression of pelvic organ prolapse. We discussed the treatment options of a pessary, physical therapy or surgery. In terms of surgery we talked about open procedures, robotic assisted procedures and vaginal reconstruction with or without the utilization of graft material.  Her main issues seem to resolve around the urgency and the overactive bladder with episodes of urge incontinence.  We discussed that I am unsure if her prolapse bladder is contributing to these urinary issues.  A trial with a pessary would be a way to determine that.  She is somewhat hesitant to try a pessary.  She is not a great surgical candidate with her past medical history.  We talked about the treatment options for OAB.  She would like to try medications.  I gave her samples of Gemtesa.  I asked her to come back and see us in about 3 weeks to see if it is working.  I gave her some literature on prolapse and overactive bladder. I was able to answer all of her questions.

## 2024-05-16 NOTE — HISTORY OF PRESENT ILLNESS
[FreeTextEntry1] : 68 black female with complaints of prolapse. She has been dealing with it for years but it is getting worse. She is also dealing with urgency and leaking with urge. She will wear pads if she is traveling. She feels she empties her bladder fully. She feels not pain or pressure with the prolapse. She gets up 1-2 times at night to void. Denies daytime frequency. Her GYN retired but was following with him for the prolapse and was just watching it. No bowel complaints. Nov 2023 Hgb A1C 5.5. Had MI years ago. She also has Hep C. Had umbilical hernia repair with mesh years ago.

## 2024-05-16 NOTE — LETTER BODY
[Dear  ___] : Dear  [unfilled], [I had the pleasure of evaluating your patient, [unfilled]. Thank you for referring Ms. [unfilled] for consultation for ___] : I had the pleasure of evaluating your patient, [unfilled]. Thank you for referring Ms. [unfilled] for consultation for [unfilled]. [Attached please find my note.] : Attached please find my note. [Thank you very much for allowing me to participate in the care of this patient. If you have any questions, please do not hesitate to contact me] : Thank you very much for allowing me to participate in the care of this patient. If you have any questions, please do not hesitate to contact me. [DrJas  ___] : Dr. DONALDSON

## 2024-06-05 ENCOUNTER — APPOINTMENT (OUTPATIENT)
Dept: UROGYNECOLOGY | Facility: CLINIC | Age: 69
End: 2024-06-05

## 2024-06-17 ENCOUNTER — APPOINTMENT (OUTPATIENT)
Dept: UROGYNECOLOGY | Facility: CLINIC | Age: 69
End: 2024-06-17
Payer: COMMERCIAL

## 2024-06-17 VITALS — SYSTOLIC BLOOD PRESSURE: 134 MMHG | DIASTOLIC BLOOD PRESSURE: 81 MMHG | HEART RATE: 89 BPM

## 2024-06-17 DIAGNOSIS — N32.81 OVERACTIVE BLADDER: ICD-10-CM

## 2024-06-17 DIAGNOSIS — N81.10 CYSTOCELE, UNSPECIFIED: ICD-10-CM

## 2024-06-17 PROCEDURE — 51798 US URINE CAPACITY MEASURE: CPT

## 2024-06-17 PROCEDURE — 99213 OFFICE O/P EST LOW 20 MIN: CPT

## 2024-06-17 RX ORDER — VIBEGRON 75 MG/1
75 TABLET, FILM COATED ORAL
Qty: 90 | Refills: 3 | Status: ACTIVE | COMMUNITY
Start: 2024-06-07 | End: 1900-01-01

## 2024-06-17 NOTE — DISCUSSION/SUMMARY
[FreeTextEntry1] : PVR normal.  Pt very happy with management.  Requesting rx be sent to mail order.  She will f/u in 6-9 months or sooner if needed.  Instructed to call with any questions or concerns and she verbalizes understanding.

## 2024-06-17 NOTE — HISTORY OF PRESENT ILLNESS
[FreeTextEntry1] : Pt presents to office for f/u on OAB.  Hx urgency, frequency, UUI started on gemtesa 75mg at last visit 5/16/24.  She reports about 90% improvement in her symptoms with the medication and she is very happy.  No longer needing depends and she is now able to make it to the bathroom with no leakage.  She denies any SE from the medication and denies any subjective feelings of incomplete emptying.  Denies any dysuria or UTI symptoms today.  Hx cystocele, she is not symptomatic and declines any treatment for this at this time.

## 2024-07-29 ENCOUNTER — APPOINTMENT (OUTPATIENT)
Dept: OTOLARYNGOLOGY | Facility: CLINIC | Age: 69
End: 2024-07-29
Payer: COMMERCIAL

## 2024-07-29 VITALS
SYSTOLIC BLOOD PRESSURE: 143 MMHG | HEIGHT: 66 IN | WEIGHT: 199 LBS | HEART RATE: 97 BPM | DIASTOLIC BLOOD PRESSURE: 79 MMHG | BODY MASS INDEX: 31.98 KG/M2

## 2024-07-29 DIAGNOSIS — J38.4 EDEMA OF LARYNX: ICD-10-CM

## 2024-07-29 PROCEDURE — 31575 DIAGNOSTIC LARYNGOSCOPY: CPT

## 2024-07-29 PROCEDURE — 99243 OFF/OP CNSLTJ NEW/EST LOW 30: CPT | Mod: 25

## 2024-07-29 NOTE — ADDENDUM
[FreeTextEntry1] :  Documented by Bogdan Fernandez acting as scribe for Dr. Higgins on 07/29/2024. All Medical record entries made by the Scribe were at my, Dr. Higgins, direction and personally dictated by me on 07/29/2024 . I have reviewed the chart and agree that the record accurately reflects my personal performance of the history, physical exam, assessment and plan. I have also personally directed, reviewed, and agreed with the discharge instructions.

## 2024-07-29 NOTE — ASSESSMENT
[FreeTextEntry1] : Reviewed and reconciled medications, allergies, PMHx, PSHx, SocHx, FMHx.  Patient presents today stating that she has a pain in the right side of her throat when swallowing for the past 10 days. She states that she had an X-ray of her throat at urgent care, but nothing was found. She states that she has the right throat pain when she is swallowing food. She states that she smokes and drinks. She denies indigestion or heartburn. She cannot recall an event that induced this throat pain. She states that the throat pain is at the level of her submandibular region.   Physical exam: -NOSE score 10 -TNSS 1 -ears are clean and clear bilaterally -deviated septum left along the floor and posteriorly -mildly inflamed turbinates -submandibular glands - right slightly larger than left, but soft  ENT Procedure: Flexible laryngoscopy -mucus in the nasopharynx -uvula extends down the BOT -edema of the postcricoid and interarytenoids. -BOT normal   Plan: -Ordered CT Neck Soft Tissue -FU with results from CT scan

## 2024-07-29 NOTE — PHYSICAL EXAM
[] : septum deviated to the left [Midline] : trachea located in midline position [Normal] : no rashes [de-identified] : submandibular glands - right slightly larger than left, but soft [Hearing Loss Right Only] : normal [Hearing Loss Left Only] : normal [de-identified] :  mildly inflamed turbinates

## 2024-07-29 NOTE — CONSULT LETTER
[Dear  ___] : Dear  [unfilled], [Consult Letter:] : I had the pleasure of evaluating your patient, [unfilled]. [Please see my note below.] : Please see my note below. [Consult Closing:] : Thank you very much for allowing me to participate in the care of this patient.  If you have any questions, please do not hesitate to contact me. [Sincerely,] : Sincerely, [FreeTextEntry3] :  Dhruv Higgins MD FACS

## 2024-07-29 NOTE — HISTORY OF PRESENT ILLNESS
[de-identified] : Patient presents today stating that she has a pain in the right side of her throat when swallowing for the past 10 days. She states that she had an X-ray of her throat at urgent care, but nothing was found. She states that she has the right throat pain when she is swallowing food. She states that she smokes and drinks. She denies indigestion or heartburn. She cannot recall an event that induced this throat pain. She states that the throat pain is at the level of her submandibular region.

## 2024-07-29 NOTE — PROCEDURE
[Complicated Symptoms] : complicated symptoms requiring more thorough examination than provided by mirror [de-identified] :  Procedure: Flexible Fiberoptic Laryngoscopy: Risks, benefits, and alternatives of flexible endoscopy were explained to the patient. The patient gave oral consent to proceed. The flexible scope was inserted into the right nasal cavity and advanced towards the nasopharynx. Visualized mucosa over the turbinates and septum were as described above. The nasopharynx with mucus. Oropharyngeal walls were symmetric and mobile without lesion, mass, or edema. Hypopharynx was also without lesion or edema. Larynx was mobile without lesions. Supraglottic structures were free of mass and asymmetry, but edema of the postcricoid and interarytenoids. True vocal folds were white without mass or lesion. Base of tongue was within normal limits. The uvula extends down the BOT. -mucus in the nasopharynx -uvula extends down the BOT -edema of the postcricoid and interarytenoids. -BOT normal

## 2024-07-31 ENCOUNTER — APPOINTMENT (OUTPATIENT)
Dept: CT IMAGING | Facility: CLINIC | Age: 69
End: 2024-07-31
Payer: COMMERCIAL

## 2024-07-31 ENCOUNTER — APPOINTMENT (OUTPATIENT)
Dept: OTOLARYNGOLOGY | Facility: CLINIC | Age: 69
End: 2024-07-31

## 2024-07-31 ENCOUNTER — OUTPATIENT (OUTPATIENT)
Dept: OUTPATIENT SERVICES | Facility: HOSPITAL | Age: 69
LOS: 1 days | End: 2024-07-31
Payer: COMMERCIAL

## 2024-07-31 DIAGNOSIS — R13.12 DYSPHAGIA, OROPHARYNGEAL PHASE: ICD-10-CM

## 2024-07-31 DIAGNOSIS — Z90.49 ACQUIRED ABSENCE OF OTHER SPECIFIED PARTS OF DIGESTIVE TRACT: Chronic | ICD-10-CM

## 2024-07-31 DIAGNOSIS — O34.21 MATERNAL CARE FOR SCAR FROM PREVIOUS CESAREAN DELIVERY: Chronic | ICD-10-CM

## 2024-07-31 DIAGNOSIS — Z98.89 OTHER SPECIFIED POSTPROCEDURAL STATES: Chronic | ICD-10-CM

## 2024-07-31 PROCEDURE — 70491 CT SOFT TISSUE NECK W/DYE: CPT | Mod: 26

## 2024-07-31 PROCEDURE — 70491 CT SOFT TISSUE NECK W/DYE: CPT

## 2024-08-07 ENCOUNTER — APPOINTMENT (OUTPATIENT)
Dept: OTOLARYNGOLOGY | Facility: CLINIC | Age: 69
End: 2024-08-07

## 2024-08-07 PROBLEM — R13.12 DYSPHAGIA, OROPHARYNGEAL: Status: ACTIVE | Noted: 2024-07-29

## 2024-08-07 PROBLEM — R07.0 THROAT PAIN: Status: ACTIVE | Noted: 2024-07-29

## 2024-08-07 PROBLEM — J32.0 CHRONIC MAXILLARY SINUSITIS: Status: ACTIVE | Noted: 2024-08-07

## 2024-08-07 PROBLEM — R22.0 SWELLING, MASS, OR LUMP IN HEAD AND NECK: Status: ACTIVE | Noted: 2024-08-07

## 2024-08-07 PROBLEM — J34.2 DEVIATED NASAL SEPTUM: Status: ACTIVE | Noted: 2024-07-29

## 2024-08-07 PROCEDURE — 99213 OFFICE O/P EST LOW 20 MIN: CPT

## 2024-08-07 NOTE — CONSULT LETTER
[Dear  ___] : Dear  [unfilled], [Courtesy Letter:] : I had the pleasure of seeing your patient, [unfilled], in my office today. [Please see my note below.] : Please see my note below. [Consult Closing:] : Thank you very much for allowing me to participate in the care of this patient.  If you have any questions, please do not hesitate to contact me. [Sincerely,] : Sincerely, [FreeTextEntry3] :  Dhruv Higgins MD FACS

## 2024-08-07 NOTE — HISTORY OF PRESENT ILLNESS
[de-identified] : Patient with h/o dysphagia and throat pain presents today to discuss results from CT scan. She states that she still feels discomfort on the left side of her throat when swallowing certain food (mostly solids), but it is not painful anymore.

## 2024-08-07 NOTE — DATA REVIEWED
[de-identified] : CT Neck Soft Tissue 7/31/24: -sinus disease in the left maxillary sinus -Small 1.0 cm x 0.5 cm hyperdense versus hyperenhancing structure in the floor of the mouth suggestive of thyroglossal duct cyst

## 2024-08-07 NOTE — ASSESSMENT
[FreeTextEntry1] : Reviewed and reconciled medications, allergies, PMHx, PSHx, SocHx, FMHx.  Patient with h/o dysphagia and throat pain presents today to discuss results from CT scan. She states that she still feels discomfort on the left side of her throat when swallowing certain food (mostly solids), but it is not painful anymore.  CT Neck Soft Tissue 7/31/24: -sinus disease in the left maxillary sinus -Small 1.0 cm x 0.5 cm hyperdense versus hyperenhancing structure in the floor of the mouth suggestive of thyroglossal duct cyst   Physical exam: -submental area feels normal -floor of the mouth appears and feels normal -level 2B swallowing pain - no abnormalities upon palpitation  Plan: -Ordered FEES -FU with results from FEES

## 2024-08-07 NOTE — PHYSICAL EXAM
[Midline] : trachea located in midline position [Normal] : no rashes [de-identified] : level 2B swallowing pain - no abnormalities upon palpitation [de-identified] : submental area feels normal. floor of the mouth appears and feels normal [FreeTextEntry2] :  sinuses nontender to percussion

## 2024-08-07 NOTE — ADDENDUM
[FreeTextEntry1] :  Documented by Bogdan Fernandez acting as scribe for Dr. Higgins on 08/07/2024. All Medical record entries made by the Scribe were at my, Dr. Higgins, direction and personally dictated by me on 08/07/2024 . I have reviewed the chart and agree that the record accurately reflects my personal performance of the history, physical exam, assessment and plan. I have also personally directed, reviewed, and agreed with the discharge instructions.

## 2024-08-08 ENCOUNTER — APPOINTMENT (OUTPATIENT)
Dept: CARDIOLOGY | Facility: CLINIC | Age: 69
End: 2024-08-08

## 2024-08-08 ENCOUNTER — NON-APPOINTMENT (OUTPATIENT)
Age: 69
End: 2024-08-08

## 2024-08-08 PROCEDURE — G2211 COMPLEX E/M VISIT ADD ON: CPT

## 2024-08-08 PROCEDURE — 93000 ELECTROCARDIOGRAM COMPLETE: CPT

## 2024-08-08 PROCEDURE — 99213 OFFICE O/P EST LOW 20 MIN: CPT

## 2024-08-08 NOTE — HISTORY OF PRESENT ILLNESS
[FreeTextEntry1] : Previous note: Patient with a history of CAD , NSTEMI, HTN, HLD, who presents for routine follow up. She denies CP, SOB at rest, palpitations, dizziness, lightheadedness, activity intolerance, syncope or near syncope. She reports she is compliant with her medications. She exercises on treadmill/ ash 2-3x/week, without CP. She does admit SOB with moderate activity like climbing multiple stairs at the train station and with increased speed at the gym x few months.   1/2020- stress test- normal. Poor exercise tolerance. Normal PFTs per patient. Underwent CPET. deconditioning.   8/2022- Patient stable. No chest pain.   10/17/2022: Patient presents to the office for preoperative cardiovascular assessment for upcoming right bunionectomy. Reports feeling well today. Denies chest pain, SOB at rest, GARCIA, palpitations, lightheadedness, dizziness, fatigue, syncope, near syncope and LE edema. Denies smoking, excessive alcohol and illicit drug use. States she walks 1/2 mile three times per week, leisurely lace. States she can walk up 2 flights of stairs.   8/2023- Doing well, no chest pain. No Sob.   8/2024- No new symptoms of chest pain.

## 2024-08-08 NOTE — DISCUSSION/SUMMARY
[EKG obtained to assist in diagnosis and management of assessed problem(s)] : EKG obtained to assist in diagnosis and management of assessed problem(s) [FreeTextEntry1] : 1. CAD- Normal stress test.  2. Htn- Stable.  3. Follow up in 1 year.  URI- dark brown mucus. 2 weeks of sxs. Plan for abx.

## 2024-08-20 ENCOUNTER — APPOINTMENT (OUTPATIENT)
Dept: FAMILY MEDICINE | Facility: CLINIC | Age: 69
End: 2024-08-20
Payer: COMMERCIAL

## 2024-08-20 VITALS
SYSTOLIC BLOOD PRESSURE: 130 MMHG | BODY MASS INDEX: 33.29 KG/M2 | HEART RATE: 91 BPM | WEIGHT: 195 LBS | DIASTOLIC BLOOD PRESSURE: 74 MMHG | TEMPERATURE: 98.7 F | OXYGEN SATURATION: 95 % | HEIGHT: 64 IN

## 2024-08-20 DIAGNOSIS — R07.0 PAIN IN THROAT: ICD-10-CM

## 2024-08-20 PROCEDURE — 99213 OFFICE O/P EST LOW 20 MIN: CPT

## 2024-08-25 NOTE — PHYSICAL EXAM
[No Acute Distress] : no acute distress [Well Nourished] : well nourished [Well-Appearing] : well-appearing [Normal Sclera/Conjunctiva] : normal sclera/conjunctiva [Normal Outer Ear/Nose] : the outer ears and nose were normal in appearance [PERRL] : pupils equal round and reactive to light [Normal Oropharynx] : the oropharynx was normal [Normal TMs] : both tympanic membranes were normal [No Lymphadenopathy] : no lymphadenopathy [Supple] : supple [No Respiratory Distress] : no respiratory distress  [No Accessory Muscle Use] : no accessory muscle use [Clear to Auscultation] : lungs were clear to auscultation bilaterally [Normal Rate] : normal rate  [Regular Rhythm] : with a regular rhythm [Normal S1, S2] : normal S1 and S2

## 2024-08-25 NOTE — HISTORY OF PRESENT ILLNESS
[FreeTextEntry8] : MS. SO IS A PLEASANT 70 YO PRESENTING FOR ACUTE VISIT.  APPROXIMATELY 4 WEEKS AGO WENT TO URGENT CARE FOR PAIN ON THE RIGHT SIDE WITH SWALLOWING.  HAD XRAY WHICH WAS REPORTEDLY OKAY.  SAW ENT FORE VALUATION.  WAS SENT FOR CT IMAGING.  FOLLOWED UP WITH ENT AFTER THE IMAGING.  WAS ADVISED TO HAVE A FLEXIBLE ENDOSCOPY ESOPHAGEAL SWALLOW SENSITIVITY TEST.  HAS NOT GONE FOR THE TESTING YET.  STATES SHE ONLY WHEN SHE EATS THAT SHE HAS THE PAIN.  IS SWALLOWING WELL.  SAW CARDIOLOGY IN FOLLOW-UP.  WAS GIVEN A ZPAK.   ENT: DR. DECKER

## 2024-08-25 NOTE — PLAN
[FreeTextEntry1] : ENT CONSULTANT NOTE FROM 8/7/24 AND 7/29/24 REVIEWED ALSO REVIEWED CARDIOLOGY CONSULTANT NOTE FROM 8/8/24 WOULD LIKE SECOND OPINION FROM ALTERNATE ENT WILL ASSIST IN SCHEDULING COMPLETE TESTING ORDERED SUPPORT GIVEN CALL WITH ANY QUESTIONS, CONCERNS OR CHANGES  ADDITIONAL TIME SPENT IN CHART REVIEW, NOTE CREATION AND CARE COORDINATION

## 2024-09-03 ENCOUNTER — APPOINTMENT (OUTPATIENT)
Dept: OTOLARYNGOLOGY | Facility: CLINIC | Age: 69
End: 2024-09-03
Payer: COMMERCIAL

## 2024-09-03 PROCEDURE — 92612 ENDOSCOPY SWALLOW (FEES) VID: CPT | Mod: GN

## 2024-09-06 ENCOUNTER — APPOINTMENT (OUTPATIENT)
Dept: OTOLARYNGOLOGY | Facility: CLINIC | Age: 69
End: 2024-09-06

## 2024-09-24 ENCOUNTER — RESULT REVIEW (OUTPATIENT)
Age: 69
End: 2024-09-24

## 2024-09-24 ENCOUNTER — OUTPATIENT (OUTPATIENT)
Dept: OUTPATIENT SERVICES | Facility: HOSPITAL | Age: 69
LOS: 1 days | End: 2024-09-24

## 2024-09-24 ENCOUNTER — APPOINTMENT (OUTPATIENT)
Dept: ULTRASOUND IMAGING | Facility: CLINIC | Age: 69
End: 2024-09-24

## 2024-09-24 DIAGNOSIS — O34.21 MATERNAL CARE FOR SCAR FROM PREVIOUS CESAREAN DELIVERY: Chronic | ICD-10-CM

## 2024-09-24 DIAGNOSIS — Z98.89 OTHER SPECIFIED POSTPROCEDURAL STATES: Chronic | ICD-10-CM

## 2024-09-24 DIAGNOSIS — Z90.49 ACQUIRED ABSENCE OF OTHER SPECIFIED PARTS OF DIGESTIVE TRACT: Chronic | ICD-10-CM

## 2024-09-24 DIAGNOSIS — R22.1 LOCALIZED SWELLING, MASS AND LUMP, NECK: ICD-10-CM

## 2024-09-24 PROCEDURE — 76536 US EXAM OF HEAD AND NECK: CPT | Mod: 26

## 2024-11-08 ENCOUNTER — APPOINTMENT (OUTPATIENT)
Dept: FAMILY MEDICINE | Facility: CLINIC | Age: 69
End: 2024-11-08
Payer: COMMERCIAL

## 2024-11-08 VITALS
BODY MASS INDEX: 34.83 KG/M2 | HEIGHT: 64 IN | HEART RATE: 94 BPM | DIASTOLIC BLOOD PRESSURE: 74 MMHG | WEIGHT: 204 LBS | OXYGEN SATURATION: 96 % | SYSTOLIC BLOOD PRESSURE: 122 MMHG

## 2024-11-08 DIAGNOSIS — Z00.00 ENCOUNTER FOR GENERAL ADULT MEDICAL EXAMINATION W/OUT ABNORMAL FINDINGS: ICD-10-CM

## 2024-11-08 PROCEDURE — 99397 PER PM REEVAL EST PAT 65+ YR: CPT

## 2024-11-08 PROCEDURE — 36415 COLL VENOUS BLD VENIPUNCTURE: CPT

## 2024-11-09 LAB
25(OH)D3 SERPL-MCNC: 34.1 NG/ML
ALBUMIN SERPL ELPH-MCNC: 4.2 G/DL
ALP BLD-CCNC: 75 U/L
ALT SERPL-CCNC: 15 U/L
ANION GAP SERPL CALC-SCNC: 12 MMOL/L
AST SERPL-CCNC: 26 U/L
BASOPHILS # BLD AUTO: 0.01 K/UL
BASOPHILS NFR BLD AUTO: 0.2 %
BILIRUB SERPL-MCNC: 0.2 MG/DL
BUN SERPL-MCNC: 17 MG/DL
CALCIUM SERPL-MCNC: 9.6 MG/DL
CHLORIDE SERPL-SCNC: 104 MMOL/L
CHOLEST SERPL-MCNC: 174 MG/DL
CO2 SERPL-SCNC: 26 MMOL/L
CREAT SERPL-MCNC: 0.87 MG/DL
EGFR: 73 ML/MIN/1.73M2
EOSINOPHIL # BLD AUTO: 0.38 K/UL
EOSINOPHIL NFR BLD AUTO: 9 %
ESTIMATED AVERAGE GLUCOSE: 103 MG/DL
GLUCOSE SERPL-MCNC: 94 MG/DL
HBA1C MFR BLD HPLC: 5.2 %
HCT VFR BLD CALC: 42.7 %
HDLC SERPL-MCNC: 60 MG/DL
HGB BLD-MCNC: 13.5 G/DL
IMM GRANULOCYTES NFR BLD AUTO: 0 %
LDLC SERPL CALC-MCNC: 102 MG/DL
LYMPHOCYTES # BLD AUTO: 1.66 K/UL
LYMPHOCYTES NFR BLD AUTO: 39.5 %
MAN DIFF?: NORMAL
MCHC RBC-ENTMCNC: 31.6 G/DL
MCHC RBC-ENTMCNC: 33.7 PG
MCV RBC AUTO: 106.5 FL
MONOCYTES # BLD AUTO: 0.38 K/UL
MONOCYTES NFR BLD AUTO: 9 %
NEUTROPHILS # BLD AUTO: 1.77 K/UL
NEUTROPHILS NFR BLD AUTO: 42.3 %
NONHDLC SERPL-MCNC: 114 MG/DL
PLATELET # BLD AUTO: 226 K/UL
POTASSIUM SERPL-SCNC: 4.2 MMOL/L
PROT SERPL-MCNC: 7.1 G/DL
RBC # BLD: 4.01 M/UL
RBC # FLD: 12.5 %
SODIUM SERPL-SCNC: 142 MMOL/L
T4 FREE SERPL-MCNC: 1.1 NG/DL
TRIGL SERPL-MCNC: 60 MG/DL
TSH SERPL-ACNC: 1.33 UIU/ML
WBC # FLD AUTO: 4.2 K/UL

## 2024-11-25 ENCOUNTER — APPOINTMENT (OUTPATIENT)
Dept: FAMILY MEDICINE | Facility: CLINIC | Age: 69
End: 2024-11-25

## 2024-12-02 LAB — VIT B12 SERPL-MCNC: 771 PG/ML

## 2024-12-10 ENCOUNTER — OUTPATIENT (OUTPATIENT)
Dept: OUTPATIENT SERVICES | Facility: HOSPITAL | Age: 69
LOS: 1 days | End: 2024-12-10
Payer: COMMERCIAL

## 2024-12-10 ENCOUNTER — RESULT REVIEW (OUTPATIENT)
Age: 69
End: 2024-12-10

## 2024-12-10 ENCOUNTER — APPOINTMENT (OUTPATIENT)
Dept: ULTRASOUND IMAGING | Facility: CLINIC | Age: 69
End: 2024-12-10
Payer: COMMERCIAL

## 2024-12-10 DIAGNOSIS — Z00.8 ENCOUNTER FOR OTHER GENERAL EXAMINATION: ICD-10-CM

## 2024-12-10 DIAGNOSIS — Z98.89 OTHER SPECIFIED POSTPROCEDURAL STATES: Chronic | ICD-10-CM

## 2024-12-10 DIAGNOSIS — O34.21 MATERNAL CARE FOR SCAR FROM PREVIOUS CESAREAN DELIVERY: Chronic | ICD-10-CM

## 2024-12-10 DIAGNOSIS — Z90.49 ACQUIRED ABSENCE OF OTHER SPECIFIED PARTS OF DIGESTIVE TRACT: Chronic | ICD-10-CM

## 2024-12-10 PROCEDURE — 76536 US EXAM OF HEAD AND NECK: CPT | Mod: 26

## 2024-12-10 PROCEDURE — 76536 US EXAM OF HEAD AND NECK: CPT

## 2025-01-02 ENCOUNTER — RESULT REVIEW (OUTPATIENT)
Age: 70
End: 2025-01-02

## 2025-01-02 ENCOUNTER — APPOINTMENT (OUTPATIENT)
Dept: ULTRASOUND IMAGING | Facility: CLINIC | Age: 70
End: 2025-01-02
Payer: COMMERCIAL

## 2025-01-02 ENCOUNTER — OUTPATIENT (OUTPATIENT)
Dept: OUTPATIENT SERVICES | Facility: HOSPITAL | Age: 70
LOS: 1 days | End: 2025-01-02

## 2025-01-02 DIAGNOSIS — O34.21 MATERNAL CARE FOR SCAR FROM PREVIOUS CESAREAN DELIVERY: Chronic | ICD-10-CM

## 2025-01-02 DIAGNOSIS — Z00.8 ENCOUNTER FOR OTHER GENERAL EXAMINATION: ICD-10-CM

## 2025-01-02 DIAGNOSIS — Z98.89 OTHER SPECIFIED POSTPROCEDURAL STATES: Chronic | ICD-10-CM

## 2025-01-02 DIAGNOSIS — Z90.49 ACQUIRED ABSENCE OF OTHER SPECIFIED PARTS OF DIGESTIVE TRACT: Chronic | ICD-10-CM

## 2025-01-02 PROCEDURE — 10005 FNA BX W/US GDN 1ST LES: CPT

## 2025-01-02 PROCEDURE — 88173 CYTOPATH EVAL FNA REPORT: CPT | Mod: 26

## 2025-01-02 PROCEDURE — 88305 TISSUE EXAM BY PATHOLOGIST: CPT | Mod: 26

## 2025-03-12 ENCOUNTER — APPOINTMENT (OUTPATIENT)
Dept: UROGYNECOLOGY | Facility: CLINIC | Age: 70
End: 2025-03-12

## 2025-03-12 VITALS
HEART RATE: 94 BPM | WEIGHT: 204 LBS | DIASTOLIC BLOOD PRESSURE: 87 MMHG | BODY MASS INDEX: 34.83 KG/M2 | HEIGHT: 64 IN | SYSTOLIC BLOOD PRESSURE: 159 MMHG | RESPIRATION RATE: 14 BRPM

## 2025-03-12 DIAGNOSIS — N39.41 URGE INCONTINENCE: ICD-10-CM

## 2025-03-12 PROCEDURE — 51798 US URINE CAPACITY MEASURE: CPT

## 2025-03-12 PROCEDURE — 99213 OFFICE O/P EST LOW 20 MIN: CPT

## 2025-05-16 RX ORDER — ALBUTEROL SULFATE 90 UG/1
108 (90 BASE) POWDER, METERED RESPIRATORY (INHALATION)
Qty: 3 | Refills: 0 | Status: ACTIVE | COMMUNITY
Start: 2025-05-15 | End: 1900-01-01

## 2025-08-26 ENCOUNTER — OUTPATIENT (OUTPATIENT)
Dept: OUTPATIENT SERVICES | Facility: HOSPITAL | Age: 70
LOS: 1 days | End: 2025-08-26
Payer: MEDICARE

## 2025-08-26 ENCOUNTER — APPOINTMENT (OUTPATIENT)
Dept: RADIOLOGY | Facility: CLINIC | Age: 70
End: 2025-08-26
Payer: MEDICARE

## 2025-08-26 ENCOUNTER — APPOINTMENT (OUTPATIENT)
Dept: FAMILY MEDICINE | Facility: CLINIC | Age: 70
End: 2025-08-26
Payer: MEDICARE

## 2025-08-26 VITALS
WEIGHT: 208 LBS | HEART RATE: 96 BPM | BODY MASS INDEX: 35.51 KG/M2 | HEIGHT: 64 IN | SYSTOLIC BLOOD PRESSURE: 146 MMHG | OXYGEN SATURATION: 96 % | DIASTOLIC BLOOD PRESSURE: 86 MMHG

## 2025-08-26 VITALS — SYSTOLIC BLOOD PRESSURE: 124 MMHG | DIASTOLIC BLOOD PRESSURE: 88 MMHG

## 2025-08-26 DIAGNOSIS — M25.511 PAIN IN RIGHT SHOULDER: ICD-10-CM

## 2025-08-26 DIAGNOSIS — M25.551 PAIN IN RIGHT HIP: ICD-10-CM

## 2025-08-26 DIAGNOSIS — Z98.89 OTHER SPECIFIED POSTPROCEDURAL STATES: Chronic | ICD-10-CM

## 2025-08-26 DIAGNOSIS — O34.21 MATERNAL CARE FOR SCAR FROM PREVIOUS CESAREAN DELIVERY: Chronic | ICD-10-CM

## 2025-08-26 PROCEDURE — 73502 X-RAY EXAM HIP UNI 2-3 VIEWS: CPT

## 2025-08-26 PROCEDURE — 73502 X-RAY EXAM HIP UNI 2-3 VIEWS: CPT | Mod: 26,RT

## 2025-08-26 PROCEDURE — 73030 X-RAY EXAM OF SHOULDER: CPT

## 2025-08-26 PROCEDURE — 99203 OFFICE O/P NEW LOW 30 MIN: CPT

## 2025-08-26 PROCEDURE — 73030 X-RAY EXAM OF SHOULDER: CPT | Mod: 26,RT
